# Patient Record
Sex: MALE | NOT HISPANIC OR LATINO | Employment: UNEMPLOYED | URBAN - METROPOLITAN AREA
[De-identification: names, ages, dates, MRNs, and addresses within clinical notes are randomized per-mention and may not be internally consistent; named-entity substitution may affect disease eponyms.]

---

## 2018-06-19 ENCOUNTER — HOSPITAL ENCOUNTER (EMERGENCY)
Facility: MEDICAL CENTER | Age: 63
End: 2018-06-19
Attending: EMERGENCY MEDICINE
Payer: MEDICAID

## 2018-06-19 VITALS
TEMPERATURE: 98 F | RESPIRATION RATE: 16 BRPM | SYSTOLIC BLOOD PRESSURE: 110 MMHG | DIASTOLIC BLOOD PRESSURE: 61 MMHG | BODY MASS INDEX: 19.5 KG/M2 | OXYGEN SATURATION: 98 % | WEIGHT: 143.96 LBS | HEIGHT: 72 IN | HEART RATE: 72 BPM

## 2018-06-19 DIAGNOSIS — S61.411A LACERATION OF RIGHT HAND WITHOUT FOREIGN BODY, INITIAL ENCOUNTER: ICD-10-CM

## 2018-06-19 PROCEDURE — 700111 HCHG RX REV CODE 636 W/ 250 OVERRIDE (IP): Performed by: EMERGENCY MEDICINE

## 2018-06-19 PROCEDURE — 90471 IMMUNIZATION ADMIN: CPT

## 2018-06-19 PROCEDURE — 90715 TDAP VACCINE 7 YRS/> IM: CPT | Performed by: EMERGENCY MEDICINE

## 2018-06-19 PROCEDURE — 304217 HCHG IRRIGATION SYSTEM

## 2018-06-19 PROCEDURE — 303747 HCHG EXTRA SUTURE

## 2018-06-19 PROCEDURE — 304999 HCHG REPAIR-SIMPLE/INTERMED LEVEL 1

## 2018-06-19 PROCEDURE — 99283 EMERGENCY DEPT VISIT LOW MDM: CPT

## 2018-06-19 RX ORDER — CEPHALEXIN 500 MG/1
500 CAPSULE ORAL 4 TIMES DAILY
Qty: 28 CAP | Refills: 0 | Status: SHIPPED | OUTPATIENT
Start: 2018-06-19 | End: 2023-09-21

## 2018-06-19 RX ADMIN — CLOSTRIDIUM TETANI TOXOID ANTIGEN (FORMALDEHYDE INACTIVATED), CORYNEBACTERIUM DIPHTHERIAE TOXOID ANTIGEN (FORMALDEHYDE INACTIVATED), BORDETELLA PERTUSSIS TOXOID ANTIGEN (GLUTARALDEHYDE INACTIVATED), BORDETELLA PERTUSSIS FILAMENTOUS HEMAGGLUTININ ANTIGEN (FORMALDEHYDE INACTIVATED), BORDETELLA PERTUSSIS PERTACTIN ANTIGEN, AND BORDETELLA PERTUSSIS FIMBRIAE 2/3 ANTIGEN 0.5 ML: 5; 2; 2.5; 5; 3; 5 INJECTION, SUSPENSION INTRAMUSCULAR at 08:16

## 2018-06-19 ASSESSMENT — PAIN SCALES - GENERAL: PAINLEVEL_OUTOF10: 0

## 2018-06-19 ASSESSMENT — LIFESTYLE VARIABLES: DO YOU DRINK ALCOHOL: NO

## 2018-06-19 NOTE — ED NOTES
Discharge instructions reviewed, no questions at this time.    Discussed home medications, prescription reviewed and given to patient.    Belongings returned to patient.    Patient ambulatory off unit.

## 2018-06-19 NOTE — ED PROVIDER NOTES
ED Provider Note    CHIEF COMPLAINT  Chief Complaint   Patient presents with   • Hand Injury       HPI  Vitor Cisneros is a 62 y.o. male who presents for evaluation of a hand laceration. The patient states he tripped this morning and fell injuring his right palm. He denies any loss of consciousness. He has no other injuries. He does not know when his last tetanus shot was.    REVIEW OF SYSTEMS  See HPI for further details. All other systems are negative.     PAST MEDICAL HISTORY  Past Medical History:   Diagnosis Date   • Heart murmur    • Schizophrenic disorder (HCC)        FAMILY HISTORY  History reviewed. No pertinent family history.    SOCIAL HISTORY  Social History     Social History   • Marital status: Single     Spouse name: N/A   • Number of children: N/A   • Years of education: N/A     Social History Main Topics   • Smoking status: Current Every Day Smoker     Packs/day: 1.50     Types: Cigarettes   • Smokeless tobacco: Never Used   • Alcohol use Yes      Comment: 3 shots of vodka every other day   • Drug use: Yes      Comment: THC daily   • Sexual activity: Not on file     Other Topics Concern   • Not on file     Social History Narrative   • No narrative on file       SURGICAL HISTORY  History reviewed. No pertinent surgical history.    CURRENT MEDICATIONS  Home Medications     Reviewed by Anila Tong R.N. (Registered Nurse) on 06/19/18 at 0719  Med List Status: Complete   Medication Last Dose Status   hydrocodone-acetaminophen (NORCO) 5-325 MG Tab per tablet  Active                ALLERGIES  No Known Allergies    PHYSICAL EXAM  VITAL SIGNS: /60   Pulse 70   Temp 36.6 °C (97.8 °F)   Resp 17   Ht 1.829 m (6')   Wt 65.3 kg (143 lb 15.4 oz)   SpO2 96%   BMI 19.52 kg/m²     Constitutional: Well developed, Well nourished, No acute distress, Non-toxic appearance.   HENT: Normocephalic, Atraumatic.   Eyes:  EOMI, Conjunctiva normal, No discharge.   Cardiovascular: Normal heart rate.   Thorax &  Lungs: No respiratory distress.   Skin: Warm, Dry.   Musculoskeletal: Right hand shows a flap-type laceration to the palm. This is approximately 4 cm in diameter. The wound does not appear to be grossly contaminated. The hand is neurovascularly intact.  Neurologic: Awake alert.    Laceration Repair Procedure Note    Indication: Laceration    Procedure: The patient was placed in the appropriate position and anesthesia around the laceration was obtained by infiltration using 1% Lidocaine without epinephrine. The area was then irrigated with high pressure normal saline. The laceration was closed with 5-0 Ethilon using interrupted sutures. There were no additional lacerations requiring repair. The wound area was then dressed with a sterile dressing.      Total repaired wound length: 6 cm.     Other Items: None    The patient tolerated the procedure well.    Complications: None          COURSE & MEDICAL DECISION MAKING  Pertinent Labs & Imaging studies reviewed. (See chart for details)  This is a 62-year-old here for evaluation of a hand laceration. The patient has a flap type laceration. His repaired per the procedure note above. I discussed with the patient that if possible. The overlying skin may not survive however we will use it as a biologic dressing. I will start him on Keflex. I referred him to the [facility] for follow-up. He is updated on his tetanus. He should return here for any worsening symptoms. He is given a discharge instruction sheet on laceration care. Stitches are to be out in 7-10 days.    FINAL IMPRESSION  1. Mechanical ground-level fall  2. Right hand laceration  3.         Electronically signed by: Sonu Arroyo, 6/19/2018 8:40 AM

## 2018-06-19 NOTE — DISCHARGE INSTRUCTIONS
Laceration Care, Adult  A laceration is a cut that goes through all of the layers of the skin and into the tissue that is right under the skin. Some lacerations heal on their own. Others need to be closed with stitches (sutures), staples, skin adhesive strips, or skin glue. Proper laceration care minimizes the risk of infection and helps the laceration to heal better.  HOW TO CARE FOR YOUR LACERATION  If sutures or staples were used:  · Keep the wound clean and dry.  · If you were given a bandage (dressing), you should change it at least one time per day or as told by your health care provider. You should also change it if it becomes wet or dirty.  · Keep the wound completely dry for the first 24 hours or as told by your health care provider. After that time, you may shower or bathe. However, make sure that the wound is not soaked in water until after the sutures or staples have been removed.  · Clean the wound one time each day or as told by your health care provider:  ¨ Wash the wound with soap and water.  ¨ Rinse the wound with water to remove all soap.  ¨ Pat the wound dry with a clean towel. Do not rub the wound.  · After cleaning the wound, apply a thin layer of antibiotic ointment as told by your health care provider. This will help to prevent infection and keep the dressing from sticking to the wound.  · Have the sutures or staples removed as told by your health care provider.  If skin adhesive strips were used:  · Keep the wound clean and dry.  · If you were given a bandage (dressing), you should change it at least one time per day or as told by your health care provider. You should also change it if it becomes dirty or wet.  · Do not get the skin adhesive strips wet. You may shower or bathe, but be careful to keep the wound dry.  · If the wound gets wet, pat it dry with a clean towel. Do not rub the wound.  · Skin adhesive strips fall off on their own. You may trim the strips as the wound heals. Do not  remove skin adhesive strips that are still stuck to the wound. They will fall off in time.  If skin glue was used:  · Try to keep the wound dry, but you may briefly wet it in the shower or bath. Do not soak the wound in water, such as by swimming.  · After you have showered or bathed, gently pat the wound dry with a clean towel. Do not rub the wound.  · Do not do any activities that will make you sweat heavily until the skin glue has fallen off on its own.  · Do not apply liquid, cream, or ointment medicine to the wound while the skin glue is in place. Using those may loosen the film before the wound has healed.  · If you were given a bandage (dressing), you should change it at least one time per day or as told by your health care provider. You should also change it if it becomes dirty or wet.  · If a dressing is placed over the wound, be careful not to apply tape directly over the skin glue. Doing that may cause the glue to be pulled off before the wound has healed.  · Do not pick at the glue. The skin glue usually remains in place for 5-10 days, then it falls off of the skin.  General Instructions  · Take over-the-counter and prescription medicines only as told by your health care provider.  · If you were prescribed an antibiotic medicine or ointment, take or apply it as told by your doctor. Do not stop using it even if your condition improves.  · To help prevent scarring, make sure to cover your wound with sunscreen whenever you are outside after stitches are removed, after adhesive strips are removed, or when glue remains in place and the wound is healed. Make sure to wear a sunscreen of at least 30 SPF.  · Do not scratch or pick at the wound.  · Keep all follow-up visits as told by your health care provider. This is important.  · Check your wound every day for signs of infection. Watch for:  ¨ Redness, swelling, or pain.  ¨ Fluid, blood, or pus.  · Raise (elevate) the injured area above the level of your heart  while you are sitting or lying down, if possible.  SEEK MEDICAL CARE IF:  · You received a tetanus shot and you have swelling, severe pain, redness, or bleeding at the injection site.  · You have a fever.  · A wound that was closed breaks open.  · You notice a bad smell coming from your wound or your dressing.  · You notice something coming out of the wound, such as wood or glass.  · Your pain is not controlled with medicine.  · You have increased redness, swelling, or pain at the site of your wound.  · You have fluid, blood, or pus coming from your wound.  · You notice a change in the color of your skin near your wound.  · You need to change the dressing frequently due to fluid, blood, or pus draining from the wound.  · You develop a new rash.  · You develop numbness around the wound.  SEEK IMMEDIATE MEDICAL CARE IF:  · You develop severe swelling around the wound.  · Your pain suddenly increases and is severe.  · You develop painful lumps near the wound or on skin that is anywhere on your body.  · You have a red streak going away from your wound.  · The wound is on your hand or foot and you cannot properly move a finger or toe.  · The wound is on your hand or foot and you notice that your fingers or toes look pale or bluish.     This information is not intended to replace advice given to you by your health care provider. Make sure you discuss any questions you have with your health care provider.     Document Released: 12/18/2006 Document Revised: 05/03/2016 Document Reviewed: 12/14/2015  [email or web address] Interactive Patient Education ©2016 [email or web address]

## 2018-06-19 NOTE — ED NOTES
Patient has about 4 cm horseshoe-shaped avulsion to R palm, cleansed with water and gauze placed over for comfort. Pain 5/10. No other complaints at this time.

## 2018-08-22 ENCOUNTER — APPOINTMENT (OUTPATIENT)
Dept: RADIOLOGY | Facility: MEDICAL CENTER | Age: 63
End: 2018-08-22
Attending: EMERGENCY MEDICINE
Payer: MEDICAID

## 2018-08-22 ENCOUNTER — HOSPITAL ENCOUNTER (EMERGENCY)
Facility: MEDICAL CENTER | Age: 63
End: 2018-08-22
Attending: EMERGENCY MEDICINE
Payer: MEDICAID

## 2018-08-22 VITALS
WEIGHT: 133.82 LBS | HEART RATE: 82 BPM | HEIGHT: 72 IN | BODY MASS INDEX: 18.13 KG/M2 | TEMPERATURE: 98.6 F | SYSTOLIC BLOOD PRESSURE: 139 MMHG | OXYGEN SATURATION: 95 % | DIASTOLIC BLOOD PRESSURE: 79 MMHG | RESPIRATION RATE: 16 BRPM

## 2018-08-22 DIAGNOSIS — R63.4 WEIGHT LOSS: ICD-10-CM

## 2018-08-22 DIAGNOSIS — F20.89 OTHER SCHIZOPHRENIA (HCC): ICD-10-CM

## 2018-08-22 LAB
ALBUMIN SERPL BCP-MCNC: 3.9 G/DL (ref 3.2–4.9)
ALBUMIN/GLOB SERPL: 1 G/DL
ALP SERPL-CCNC: 85 U/L (ref 30–99)
ALT SERPL-CCNC: 20 U/L (ref 2–50)
ANION GAP SERPL CALC-SCNC: 11 MMOL/L (ref 0–11.9)
AST SERPL-CCNC: 50 U/L (ref 12–45)
BASOPHILS # BLD AUTO: 0.5 % (ref 0–1.8)
BASOPHILS # BLD: 0.04 K/UL (ref 0–0.12)
BILIRUB SERPL-MCNC: 0.5 MG/DL (ref 0.1–1.5)
BUN SERPL-MCNC: 12 MG/DL (ref 8–22)
CALCIUM SERPL-MCNC: 9.4 MG/DL (ref 8.5–10.5)
CHLORIDE SERPL-SCNC: 106 MMOL/L (ref 96–112)
CO2 SERPL-SCNC: 25 MMOL/L (ref 20–33)
CREAT SERPL-MCNC: 0.55 MG/DL (ref 0.5–1.4)
EOSINOPHIL # BLD AUTO: 0.09 K/UL (ref 0–0.51)
EOSINOPHIL NFR BLD: 1.1 % (ref 0–6.9)
ERYTHROCYTE [DISTWIDTH] IN BLOOD BY AUTOMATED COUNT: 45.2 FL (ref 35.9–50)
GLOBULIN SER CALC-MCNC: 4.1 G/DL (ref 1.9–3.5)
GLUCOSE SERPL-MCNC: 74 MG/DL (ref 65–99)
HCT VFR BLD AUTO: 40.8 % (ref 42–52)
HGB BLD-MCNC: 13.5 G/DL (ref 14–18)
IMM GRANULOCYTES # BLD AUTO: 0.03 K/UL (ref 0–0.11)
IMM GRANULOCYTES NFR BLD AUTO: 0.4 % (ref 0–0.9)
LIPASE SERPL-CCNC: 17 U/L (ref 11–82)
LYMPHOCYTES # BLD AUTO: 1.11 K/UL (ref 1–4.8)
LYMPHOCYTES NFR BLD: 14.2 % (ref 22–41)
MCH RBC QN AUTO: 33.6 PG (ref 27–33)
MCHC RBC AUTO-ENTMCNC: 33.1 G/DL (ref 33.7–35.3)
MCV RBC AUTO: 101.5 FL (ref 81.4–97.8)
MONOCYTES # BLD AUTO: 0.48 K/UL (ref 0–0.85)
MONOCYTES NFR BLD AUTO: 6.1 % (ref 0–13.4)
NEUTROPHILS # BLD AUTO: 6.08 K/UL (ref 1.82–7.42)
NEUTROPHILS NFR BLD: 77.7 % (ref 44–72)
NRBC # BLD AUTO: 0 K/UL
NRBC BLD-RTO: 0 /100 WBC
PLATELET # BLD AUTO: 236 K/UL (ref 164–446)
PMV BLD AUTO: 9.7 FL (ref 9–12.9)
POC BREATHALIZER: 0 PERCENT (ref 0–0.01)
POTASSIUM SERPL-SCNC: 4 MMOL/L (ref 3.6–5.5)
PROT SERPL-MCNC: 8 G/DL (ref 6–8.2)
RBC # BLD AUTO: 4.02 M/UL (ref 4.7–6.1)
SODIUM SERPL-SCNC: 142 MMOL/L (ref 135–145)
TROPONIN I SERPL-MCNC: <0.01 NG/ML (ref 0–0.04)
TSH SERPL DL<=0.005 MIU/L-ACNC: 0.54 UIU/ML (ref 0.38–5.33)
WBC # BLD AUTO: 7.8 K/UL (ref 4.8–10.8)

## 2018-08-22 PROCEDURE — 302970 POC BREATHALIZER: Performed by: EMERGENCY MEDICINE

## 2018-08-22 PROCEDURE — 84443 ASSAY THYROID STIM HORMONE: CPT

## 2018-08-22 PROCEDURE — 90791 PSYCH DIAGNOSTIC EVALUATION: CPT

## 2018-08-22 PROCEDURE — 84484 ASSAY OF TROPONIN QUANT: CPT

## 2018-08-22 PROCEDURE — 71045 X-RAY EXAM CHEST 1 VIEW: CPT

## 2018-08-22 PROCEDURE — 36415 COLL VENOUS BLD VENIPUNCTURE: CPT

## 2018-08-22 PROCEDURE — 80053 COMPREHEN METABOLIC PANEL: CPT

## 2018-08-22 PROCEDURE — 85025 COMPLETE CBC W/AUTO DIFF WBC: CPT

## 2018-08-22 PROCEDURE — 99284 EMERGENCY DEPT VISIT MOD MDM: CPT

## 2018-08-22 PROCEDURE — 83690 ASSAY OF LIPASE: CPT

## 2018-08-22 ASSESSMENT — PAIN SCALES - GENERAL
PAINLEVEL_OUTOF10: 0
PAINLEVEL_OUTOF10: 8

## 2018-08-22 NOTE — ED TRIAGE NOTES
"Vitor Cisneros  Chief Complaint   Patient presents with   • Loss of Appetite   • Other   • Psych Eval     Pt ambulatory to triage with above complaint. Pt c/o weight loss and having a decreased appetite. Pt also c/o of generalized weakness, stating \"I feel sick.\" Pt denies SOB, CP, or abd pain. Pt also c/o of feeling depressed, pt denies SI/HI.  Pt reports having prescription for trazodone, but admits to being non-compliant with medications.    /83   Pulse 74   Temp 37 °C (98.6 °F) (Temporal)   Resp 14   Ht 1.829 m (6')   Wt 60.7 kg (133 lb 13.1 oz)   SpO2 95%   BMI 18.15 kg/m²     Pt informed of triage process and encouraged to notify staff of any changes or concerns. Pt verbalized understanding of instructions. Apologized for long wait time. Pt placed back in lobby.     "

## 2018-08-22 NOTE — DISCHARGE INSTRUCTIONS
Follow-up with your doctor.  Return to the ER for any new medical problems or complaints follow-up with [facility] for primary care      Schizophrenia  Schizophrenia is a mental illness. It may cause disturbed or disorganized thinking, speech, or behavior. People with schizophrenia have problems functioning in one or more areas of life. People with schizophrenia are at increased risk for suicide, certain long-term (chronic) physical illnesses, and unhealthy behaviors, such as smoking and drug use.  People who have family members with schizophrenia are at higher risk of developing the illness. Schizophrenia affects men and women equally, but it usually appears at an earlier age (teenage or early adult years) in men.  What are the causes?  The cause of this condition is not known.  What increases the risk?  The following factors may make you more likely to develop this condition:  · Having a family member who has schizophrenia. Some gene combinations may increase the risk, but there is no single gene that causes schizophrenia.  · Impaired brain or neurotransmitter development or chemistry. Neurotransmitters are chemicals in the brain.  What are the signs or symptoms?  The earliest symptoms are often subtle and may go unnoticed until the illness becomes more severe (first-break psychosis). Symptoms of schizophrenia may be ongoing (continuous) or may come and go in severity. Episodes are often triggered by major life events, such as:  · Family stress.  · College.  ·  service.  · Marriage.  · Pregnancy or childbirth.  · Divorce.  · Loss of a loved one.  Symptoms may include:  · Seeing, hearing, or feeling things that do not exist (hallucinations).  · Having false beliefs (delusions). Delusions often involve beliefs that you are being attacked, harassed, cheated, persecuted, or conspired against (persecutory delusions).  · Speech that does not make sense to others or is hard to understand  (incoherent).  · Behavior that is odd, confused, unfocused, withdrawn, or disorganized.  · Extremely overactive or underactive motor activity (catatonia). Motor activity is any action that involves the muscles.  · [place] or blunted emotions (flat affect).  · Loss of will power (avolition).  · Withdrawal from social contacts (social isolation).  Symptoms may affect the level of functioning in one or more major areas of life, such as work, school, relationships, or self-care.  How is this diagnosed?  Schizophrenia is diagnosed through an assessment by a mental health care provider.  · Your mental health care provider may ask questions about:  ¨ Your thoughts, behavior, and mood.  ¨ Your ability to function in daily life.  ¨ Your medical history.  ¨ Any use of alcohol or drugs, including prescription medicines.  · You may have blood tests and imaging exams.  How is this treated?  Schizophrenia is a chronic illness that is best controlled with continuous treatment rather than treatment only when symptoms occur. The following treatments are used to manage schizophrenia:  · Medicine. This is the most effective and important form of treatment for schizophrenia. Antipsychotic medicines are usually prescribed to help manage schizophrenia. Other types of medicine may be added to relieve any symptoms that may occur despite the use of antipsychotic medicines.  · Counseling or talk therapy. Individual, group, or family counseling may be helpful in providing education, support, and guidance. Many people also benefit from social skills and job skills (vocational) training.  A combination of medicine and counseling is best for managing the disorder over time. A procedure in which electricity is applied to the brain through the scalp (electroconvulsive therapy) may be used to treat catatonic schizophrenia or schizophrenia in people who cannot take medicine or do not respond to medicine and counseling.  Follow these instructions at  home:  · Keep stress under control. Stress may trigger psychosis and make symptoms worse.  · Try to get as much sleep as you can.  · Avoid alcohol and drugs. They can affect how medicine works and make symptoms worse.  · Surround yourself with people who care about you and can help you manage your condition.  · Take over-the-counter and prescription medicines only as told by your health care provider.  · Keep all follow-up visits as told by your health care provider and counselor. This is important.  Contact a health care provider if:  · You have a bad response to changes in medicines or to your treatment plan.  · You have trouble falling sleep.  · You have a low mood that will not go away.  · You are using:  ¨ Drugs.  ¨ Too much caffeine.  ¨ Tobacco products.  ¨ Alcohol.  Get help right away if:  · You feel out of control.  · You or others notice warning signs of suicide such as:  ¨ Increased use of drugs or alcohol  ¨ Expressing feelings of not having a purpose in life, being trapped, guilty, anxious and agitated, or hopeless.  ¨ Withdrawing from friends and family.  ¨ Showing uncontrolled anger, recklessness, and dramatic mood changes.  ¨ Talking about suicide, discussing or searching for methods.  If you ever feel like you may hurt yourself or others, or have thoughts about taking your own life, get help right away. You can go to your nearest emergency department or call:  · Your local emergency services (911 in the [place]  · A suicide crisis helpline, such as the National Suicide Prevention Lifeline at 1-931.244.7839. This is open 24 hours a day.  Summary  · Schizophrenia is a mental illness that causes disturbed or disorganized thinking, speech, or behavior.  · Symptoms of schizophrenia may be ongoing or may come and go. They are often triggered by major life events.  · Keep stress under control. Stress may trigger psychosis and make symptoms worse.  · Avoid alcohol and drugs. They can affect how medicine  works and make symptoms worse.  · Get help right away if you feel out of control.  This information is not intended to replace advice given to you by your health care provider. Make sure you discuss any questions you have with your health care provider.  Document Released: 12/15/2001 Document Revised: 09/29/2017 Document Reviewed: 09/29/2017  [employer] Interactive Patient Education © 2017 [employer]

## 2018-08-22 NOTE — ED PROVIDER NOTES
ED Provider Note    CHIEF COMPLAINT  Chief Complaint   Patient presents with   • Loss of Appetite   • Other   • Psych Eval       HPI  Vitor Cisneros is a 62 y.o. male who presents to the emergency department for loss of appetite, and an exacerbation of schizophrenia.  The patient states that he has lost his medications is not taking his medications.  Not sure with hearing voices.  Denies being suicidal.  Concerned he is losing weight.  Has had about a 30 pound unintentional weight loss over the last 6 months.  Denies any fevers or chills.  Denies any chest pain cough shortness of breath.  No nausea vomiting or diaphoresis.  Nothing makes it better, nothing makes it worse.  No other acute concerns or complaints.    REVIEW OF SYSTEMS  See HPI for further details. All other systems are negative.    PAST MEDICAL HISTORY  Past Medical History:   Diagnosis Date   • Heart murmur    • Schizophrenic disorder (HCC)        FAMILY HISTORY  History reviewed. No pertinent family history.    SOCIAL HISTORY  Social History     Social History   • Marital status: Single     Spouse name: N/A   • Number of children: N/A   • Years of education: N/A     Social History Main Topics   • Smoking status: Current Every Day Smoker     Packs/day: 1.50     Types: Cigarettes   • Smokeless tobacco: Never Used   • Alcohol use Yes      Comment: 3 shots of vodka every other day and weekends   • Drug use: Yes     Types: Inhaled      Comment: THC daily   • Sexual activity: Not on file     Other Topics Concern   • Not on file     Social History Narrative   • No narrative on file       SURGICAL HISTORY  History reviewed. No pertinent surgical history.    CURRENT MEDICATIONS  Home Medications     Reviewed by Dajuan Alatorre R.N. (Registered Nurse) on 08/22/18 at 0748  Med List Status: Partial   Medication Last Dose Status   cephALEXin (KEFLEX) 500 MG Cap Not Taking Active   hydrocodone-acetaminophen (NORCO) 5-325 MG Tab per tablet Not Taking Active    TRAZODONE HCL PO  Active                ALLERGIES  No Known Allergies    PHYSICAL EXAM  VITAL SIGNS: /83   Pulse 74   Temp 37 °C (98.6 °F) (Temporal)   Resp 14   Ht 1.829 m (6')   Wt 60.7 kg (133 lb 13.1 oz)   SpO2 95%   BMI 18.15 kg/m²    Constitutional: Dirty and unkempt.  No acute distress.  HENT: Normocephalic, Atraumatic, Bilateral external ears normal, Oropharynx moist, No oral exudates, Nose normal.   Eyes: PERRL, EOMI, Conjunctiva normal, No discharge.   Neck: Normal range of motion, No tenderness, Supple, No stridor.   Lymphatic: No lymphadenopathy noted.   Cardiovascular: Normal heart rate, Normal rhythm, No murmurs, No rubs, No gallops.   Thorax & Lungs: Normal breath sounds, No respiratory distress, No wheezing, No chest tenderness.   Abdomen: Bowel sounds normal, Soft, No tenderness,  Skin: Warm, Dry, No erythema, No rash.   Back: No tenderness, No CVA tenderness. .   Musculoskeletal: Good range of motion in all major joints.  Neurologic: Alert & oriented x 3, No focal deficits noted.   Psychiatric: Affect flat.  Positive auditory hallucinations    RADIOLOGY/PROCEDURES  DX-CHEST-PORTABLE (1 VIEW)   Final Result         1. No acute cardiopulmonary abnormalities are identified.            Results for orders placed or performed during the hospital encounter of 08/22/18   CBC WITH DIFFERENTIAL   Result Value Ref Range    WBC 7.8 4.8 - 10.8 K/uL    RBC 4.02 (L) 4.70 - 6.10 M/uL    Hemoglobin 13.5 (L) 14.0 - 18.0 g/dL    Hematocrit 40.8 (L) 42.0 - 52.0 %    .5 (H) 81.4 - 97.8 fL    MCH 33.6 (H) 27.0 - 33.0 pg    MCHC 33.1 (L) 33.7 - 35.3 g/dL    RDW 45.2 35.9 - 50.0 fL    Platelet Count 236 164 - 446 K/uL    MPV 9.7 9.0 - 12.9 fL    Neutrophils-Polys 77.70 (H) 44.00 - 72.00 %    Lymphocytes 14.20 (L) 22.00 - 41.00 %    Monocytes 6.10 0.00 - 13.40 %    Eosinophils 1.10 0.00 - 6.90 %    Basophils 0.50 0.00 - 1.80 %    Immature Granulocytes 0.40 0.00 - 0.90 %    Nucleated RBC 0.00 /100 WBC     Neutrophils (Absolute) 6.08 1.82 - 7.42 K/uL    Lymphs (Absolute) 1.11 1.00 - 4.80 K/uL    Monos (Absolute) 0.48 0.00 - 0.85 K/uL    Eos (Absolute) 0.09 0.00 - 0.51 K/uL    Baso (Absolute) 0.04 0.00 - 0.12 K/uL    Immature Granulocytes (abs) 0.03 0.00 - 0.11 K/uL    NRBC (Absolute) 0.00 K/uL   COMP METABOLIC PANEL   Result Value Ref Range    Sodium 142 135 - 145 mmol/L    Potassium 4.0 3.6 - 5.5 mmol/L    Chloride 106 96 - 112 mmol/L    Co2 25 20 - 33 mmol/L    Anion Gap 11.0 0.0 - 11.9    Glucose 74 65 - 99 mg/dL    Bun 12 8 - 22 mg/dL    Creatinine 0.55 0.50 - 1.40 mg/dL    Calcium 9.4 8.5 - 10.5 mg/dL    AST(SGOT) 50 (H) 12 - 45 U/L    ALT(SGPT) 20 2 - 50 U/L    Alkaline Phosphatase 85 30 - 99 U/L    Total Bilirubin 0.5 0.1 - 1.5 mg/dL    Albumin 3.9 3.2 - 4.9 g/dL    Total Protein 8.0 6.0 - 8.2 g/dL    Globulin 4.1 (H) 1.9 - 3.5 g/dL    A-G Ratio 1.0 g/dL   LIPASE   Result Value Ref Range    Lipase 17 11 - 82 U/L   TROPONIN   Result Value Ref Range    Troponin I <0.01 0.00 - 0.04 ng/mL   TSH   Result Value Ref Range    TSH 0.540 0.380 - 5.330 uIU/mL   ESTIMATED GFR   Result Value Ref Range    GFR If African American >60 >60 mL/min/1.73 m 2    GFR If Non African American >60 >60 mL/min/1.73 m 2   POC BREATHALIZER   Result Value Ref Range    POC Breathalizer 0.00 0.00 - 0.01 Percent        COURSE & MEDICAL DECISION MAKING  Pertinent Labs & Imaging studies reviewed. (See chart for details)  The patient presents emergency department for weight loss, and decompensated schizophrenia.  He denies being suicidal to me.  States he is hearing voices.  Would like to get restarted on his medications.  From this perspective he seems fairly stable he is not suicidal homicidal.  Is been seen by Rita does not meet criteria to be held against as well.  He can follow-up as an outpatient.  Rita is arranged outpatient follow-up for him with psychiatry.    The patient reports a weight loss.  Look to his chart he does  have about a 20-25 pound weight loss.  The metabolic workup including a screening chest x-ray which does not show malignancy.  He has a mild anemia.  This could represent a likely needs further workup.  Troponin is negative.  TSH is normal.      I had the  come down and arrange primary care follow-up appointment for him.  States they will comply.  The patient's questions are answered, is agreeable to plan and discharged in good condition.    Rashaun Lopez M.D.  [address] [place] [id number]  [phone]    Go on 9/6/2018  follow up appointment scheduled at 12:50 pm          The patient was noted to have elevated blood pressure while in the ER and was counseled to see their doctor within one wee to have this rechecked.    FINAL IMPRESSION  1. Other schizophrenia (HCC)    2. Weight loss        2.   3.         Electronically signed by: Michael Rodriguez, 8/22/2018 8:42 AM

## 2018-08-22 NOTE — ED NOTES
Pt discharged to home. Pt was given follow up instructions. Pt verbalized understanding of all instructions for discharge and is ambulatory out of ED with steady gait.

## 2018-08-22 NOTE — CONSULTS
"[facility]   TRIAGE ASSESSMENT    Name: Vitor Cisneros  MRN: [id number]  : 1955  Age: 62 y.o.  Date of assessment: 2018  PCP: Pcp Pt States None  Persons in attendance: Patient    CHIEF COMPLAINT/PRESENTING ISSUE (as stated by pt):   Chief Complaint   Patient presents with   • Loss of Appetite   • Other   • Psych Eval        CURRENT LIVING SITUATION/SOCIAL SUPPORT: pt reports depression; off depression meds for a month.  Reports he was living in a motel, but \"lost my room,\" and has since been homeless.  He stopped taking his meds because he felt he had no where to keep them while on the streets.    Pt denies SI, HI and hallucinations; alert and oriented x4.  He is able to care for self, reporting in detail how and where he gets food and water.    BEHAVIORAL HEALTH TREATMENT HISTORY  Does patient/parent report a history of prior behavioral health treatment for patient?   Yes:    Dates Level of Care Facilty/Provider Diagnosis/Problem Medications   2017 inpt [facility] depression    current Outpt [facility]-psych med mgmt                                                             Pt denies any other inpt psych hospitalization.  Hx of schizophrenia on chart but pt denies.      SAFETY ASSESSMENT - SELF  Does patient acknowledge current or past symptoms of dangerousness to self? no  Does parent/significant other report patient has current or past symptoms of dangerousness to self? N\A  Does presenting problem suggest symptoms of dangerousness to self? No    SAFETY ASSESSMENT - OTHERS  Does patient acknowledge current or past symptoms of aggressive behavior or risk to others? no  Does parent/significant other report patient has current or past symptoms of aggressive behavior or risk to others?  N\A  Does presenting problem suggest symptoms of dangerousness to others? No    Crisis Safety Plan completed and copy given to patient? No, eval only    ABUSE/NEGLECT SCREENING  Does patient report feeling " "“unsafe” in his/her home, or afraid of anyone?  no  Does patient report any history of physical, sexual, or emotional abuse?  no  Does parent or significant other report any of the above? N\A  Is there evidence of neglect by self?  no  Is there evidence of neglect by a caregiver? no  Does the patient/parent report any history of CPS/APS/police involvement related to suspected abuse/neglect or domestic violence? no  Based on the information provided during the current assessment, is a mandated report of suspected abuse/neglect being made?  No    SUBSTANCE USE SCREENING  Yes:  Mele all substances used in the past 30 days:  Pt denies all substance use      Last Use Amount   []   Alcohol     []   Marijuana     []   Heroin     []   Prescription Opioids  (used without prescription, for    recreation, or in excess of prescribed amount)     []   Other Prescription  (used without prescription, for    recreation, or in excess of prescribed amount)     []   Cocaine      []   Methamphetamine     []   \"\" drugs (ectasy, MDMA)     []   Other substances        UDS results: none  Breathalyzer results: 0.0    What consequences does the patient associate with any of the above substance use and or addictive behaviors? None    Risk factors for detox (check all that apply):  []  Seizures   []  Diaphoretic (sweating)   []  Tremors   []  Hallucinations   []  Increased blood pressure   []  Decreased blood pressure   []  Other   []  None      [] Patient education on risk factors for detoxification and instructed to return to ER as needed.      MENTAL STATUS   Participation: Active verbal participation, Engaged and Open to feedback  Grooming: Casual  Orientation: Alert and Fully Oriented  Behavior: Calm  Eye contact: Good  Mood: Depressed  Affect: Flexible  Thought process: Logical and Goal-directed  Thought content: Within normal limits  Speech: Rate within normal limits and Volume within normal limits  Perception: Within normal " limits  Memory:  No gross evidence of memory deficits  Insight: Adequate  Judgment:  Adequate  Other:    Collateral information: past visits  Source:  [] Significant other present in person:   [] Significant other by telephone  [] [facility]   [] [facility] Nursing Staff  [x] [facility]  [] Other:     [] Unable to complete full assessment due to:  [] Acute intoxication  [] Patient declined to participate/engage  [] Patient verbally unresponsive  [] Significant cognitive deficits  [] Significant perceptual distortions or behavioral disorganization  [] Other:      CLINICAL IMPRESSIONS:  Primary:  Depression  Secondary:  Medication Noncompliance       IDENTIFIED NEEDS/PLAN:  [Trigger DISPOSITION list for any items marked]    []  Imminent safety risk - self [] Imminent safety risk - others   []  Acute substance withdrawal []  Psychosis/Impaired reality testing   [x]  Mood/anxiety []  Substance use/Addictive behavior   []  Maladaptive behaviro []  Parent/child conflict   []  Family/Couples conflict []  Biomedical   [x]  Housing [x]  Financial   []   Legal  Occupational/Educational   []  Domestic violence []  Other:     Disposition: Refer to [facility] and [facility]    Does patient express agreement with the above plan? yes    Referral appointment(s) scheduled? N\A    Alert team only: Pt to DC to self.  Pt advised to go back to [facility] and get back on his meds.  Talked about finding a way to keep them on his person to ensure he stays on them.  Talked about the side effects, including exacerbated symptoms, from abruptly ceasing psych meds.  Pt agreeble and says he plans to go back, or to the [facility], which I also offered as an option.    I have discussed findings and recommendations with Dr. Puga, who is in agreement with these recommendations.       Lisa Granados R.N.  8/22/2018

## 2018-08-22 NOTE — ED NOTES
Pt requesting food and water, all results other than urine are back. Pt aware that urine sample is needed.

## 2019-01-12 ENCOUNTER — HOSPITAL ENCOUNTER (EMERGENCY)
Facility: MEDICAL CENTER | Age: 64
End: 2019-01-12
Attending: EMERGENCY MEDICINE
Payer: MEDICAID

## 2019-01-12 VITALS
BODY MASS INDEX: 18.31 KG/M2 | RESPIRATION RATE: 16 BRPM | DIASTOLIC BLOOD PRESSURE: 81 MMHG | WEIGHT: 135 LBS | HEART RATE: 82 BPM | SYSTOLIC BLOOD PRESSURE: 127 MMHG | TEMPERATURE: 97.3 F

## 2019-01-12 DIAGNOSIS — F10.920 ALCOHOLIC INTOXICATION WITHOUT COMPLICATION (HCC): ICD-10-CM
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
## 2019-01-12 DIAGNOSIS — T14.8XXA ABRASION: ICD-10-CM

## 2019-01-12 LAB — POC BREATHALIZER: 0.18 PERCENT (ref 0–0.01)

## 2019-01-12 PROCEDURE — 99284 EMERGENCY DEPT VISIT MOD MDM: CPT

## 2019-01-12 PROCEDURE — 302970 POC BREATHALIZER: Performed by: EMERGENCY MEDICINE

## 2019-01-12 NOTE — ED PROVIDER NOTES
ED Provider Note    CHIEF COMPLAINT  Chief Complaint   Patient presents with   • Alcohol Intoxication     EMS reports pt was seen walking down sidewalk when he fell and hit his head. Skin tear to right outer head. ETOH       HPI  Vitor Cisneros is a 63 y.o. male who presents for evaluation after witnessed ground-level fall.  The patient is brought in by EMS.  He was walking down the sidewalk when the fire department saw him fall to the ground.  He had no loss of consciousness.  He appears to be intoxicated and therefore is brought to the emergency department.  Patient states he is not been drinking although a week below on the breathalyzer has come back at 0.25.  The patient has no medical complaints at this time.  He does have a past medical history of schizophrenia.  He has been seen in this emergency department multiple times.    REVIEW OF SYSTEMS  See HPI for further details. All other systems negative.    PAST MEDICAL HISTORY  Past Medical History:   Diagnosis Date   • Heart murmur    • Schizophrenic disorder (HCC)        FAMILY HISTORY  No family history on file.    SOCIAL HISTORY  Social History     Social History   • Marital status: Single     Spouse name: N/A   • Number of children: N/A   • Years of education: N/A     Social History Main Topics   • Smoking status: Current Every Day Smoker     Packs/day: 1.50     Types: Cigarettes   • Smokeless tobacco: Never Used   • Alcohol use Yes      Comment: 3 shots of vodka every other day and weekends   • Drug use: Yes     Types: Inhaled      Comment: THC daily   • Sexual activity: Not on file     Other Topics Concern   • Not on file     Social History Narrative   • No narrative on file       SURGICAL HISTORY  No past surgical history on file.    CURRENT MEDICATIONS  Home Medications    **Home medications have not yet been reviewed for this encounter**         ALLERGIES  No Known Allergies    PHYSICAL EXAM  VITAL SIGNS: /81   Pulse 82   Temp 36.3 °C (97.3 °F)  (Temporal)   Resp 16   Wt 61.2 kg (135 lb)   BMI 18.31 kg/m²   Constitutional: Well developed, Well nourished, No acute distress.   HENT: Normocephalic, very minor abrasion to the right parietal region.  No bleeding.  Eyes:  EOMI, Conjunctiva normal, No discharge.   Cardiovascular: Normal heart rate, Normal rhythm, No murmurs, No rubs, No gallops.   Thorax & Lungs: Clear to auscultation without wheezes, rales, or rhonchi.    Abdomen: Soft and nontender.   Skin: Warm, Dry.  Musculoskeletal: Good range of motion in all major joints.  Neurologic: Sleepy but arousable to verbal stimuli.  He moves all extremities spontaneously and symmetrically with no focal weakness.  He is slurring his speech and appears intoxicated.      COURSE & MEDICAL DECISION MAKING  Pertinent Labs & Imaging studies reviewed. (See chart for details)  This is a 63-year-old here for evaluation after a witnessed ground-level fall.  The patient is an alcoholic and is acutely intoxicated.  He did sustain minor trauma to his head.  I do not suspect an acute intracranial injury at this time and I have not ordered a CT scan for evaluation.  The patient will be kept here in the emergency department and observed until he is functionally sober at which time he will be discharged.  He will be provided a discharge instruction sheet on alcohol intoxication and abrasions.    FINAL IMPRESSION  1.  Acute alcohol intoxication  2.  Scalp abrasion  3.  Ground-level fall         Electronically signed by: Sonu Arroyo, 1/12/2019 1:27 PM

## 2019-01-13 NOTE — ED NOTES
"Pt able to ambulate stable/steady gait. When asking pt orientation questions pt stated \"the fuck you asking me questions for, get the fuck out my room.\" Pt then began yelling constantly as RN left room. Secuirty notified. Security escorted pt out of ED.  "

## 2019-01-29 ENCOUNTER — DOCUMENTATION (OUTPATIENT)
Dept: CARDIOLOGY | Facility: MEDICAL CENTER | Age: 64
End: 2019-01-29

## 2019-01-29 NOTE — PROGRESS NOTES
Received EKG done at [facility], but into scanning for upcoming NP appt with [facility] on 1-31-19.

## 2019-06-28 NOTE — NUR
FIRST CONTACT WITH PT. PT C/O ABDOMINAL PAIN WITH N/V X TODAY. PT'S AOX4. RESP 
SEVEN AND UNLABORED. BP/SPO2 MONITORS IN PLACE. CALL LIGHT WITHIN REACH. PA AT 
BEDSIDE TO EVALUATE AT THIS TIME.

## 2020-04-14 ENCOUNTER — HOSPITAL ENCOUNTER (EMERGENCY)
Dept: HOSPITAL 8 - ED | Age: 65
Discharge: HOME | End: 2020-04-14
Payer: MEDICAID

## 2020-04-14 VITALS — SYSTOLIC BLOOD PRESSURE: 136 MMHG | DIASTOLIC BLOOD PRESSURE: 90 MMHG

## 2020-04-14 VITALS — WEIGHT: 140.21 LBS | HEIGHT: 73 IN | BODY MASS INDEX: 18.58 KG/M2

## 2020-04-14 DIAGNOSIS — Z48.02: ICD-10-CM
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
## 2020-04-14 DIAGNOSIS — S01.81XD: Primary | ICD-10-CM

## 2020-04-14 PROCEDURE — 99281 EMR DPT VST MAYX REQ PHY/QHP: CPT

## 2020-04-14 NOTE — NUR
TASK RN: Patient given discharge instructions and they have confirmed that they 
understand the instructions.  Patient ambulatory with steady gait. Pt left with 
d/c paperwork and all personal belongings.

## 2020-04-14 NOTE — NUR
TASK RN, FIRST CONTACT WITH PT. PA at bedside. Pt sitting on gurney. No acute 
distress noticed. Pt states, "It feels like two stiches are left in my skin 
from when I had them removed in November." No needs expressed, call light 
within reach.

## 2021-03-03 DIAGNOSIS — Z23 NEED FOR VACCINATION: ICD-10-CM

## 2021-05-27 ENCOUNTER — APPOINTMENT (OUTPATIENT)
Dept: RADIOLOGY | Facility: MEDICAL CENTER | Age: 66
End: 2021-05-27
Attending: EMERGENCY MEDICINE
Payer: MEDICAID

## 2021-05-27 ENCOUNTER — HOSPITAL ENCOUNTER (EMERGENCY)
Facility: MEDICAL CENTER | Age: 66
End: 2021-05-27
Attending: EMERGENCY MEDICINE
Payer: MEDICAID

## 2021-05-27 VITALS
HEART RATE: 65 BPM | BODY MASS INDEX: 18.6 KG/M2 | OXYGEN SATURATION: 96 % | SYSTOLIC BLOOD PRESSURE: 153 MMHG | WEIGHT: 137.35 LBS | TEMPERATURE: 98.9 F | DIASTOLIC BLOOD PRESSURE: 77 MMHG | RESPIRATION RATE: 16 BRPM | HEIGHT: 72 IN

## 2021-05-27 DIAGNOSIS — S42.021A CLOSED DISPLACED FRACTURE OF SHAFT OF RIGHT CLAVICLE, INITIAL ENCOUNTER: ICD-10-CM

## 2021-05-27 PROCEDURE — 700102 HCHG RX REV CODE 250 W/ 637 OVERRIDE(OP): Performed by: EMERGENCY MEDICINE

## 2021-05-27 PROCEDURE — A9270 NON-COVERED ITEM OR SERVICE: HCPCS | Performed by: EMERGENCY MEDICINE

## 2021-05-27 PROCEDURE — 73000 X-RAY EXAM OF COLLAR BONE: CPT | Mod: RT

## 2021-05-27 PROCEDURE — 73030 X-RAY EXAM OF SHOULDER: CPT | Mod: RT

## 2021-05-27 PROCEDURE — 71045 X-RAY EXAM CHEST 1 VIEW: CPT

## 2021-05-27 PROCEDURE — 99284 EMERGENCY DEPT VISIT MOD MDM: CPT

## 2021-05-27 RX ORDER — HYDROCODONE BITARTRATE AND ACETAMINOPHEN 5; 325 MG/1; MG/1
1 TABLET ORAL EVERY 6 HOURS PRN
Qty: 8 TABLET | Refills: 0 | Status: SHIPPED | OUTPATIENT
Start: 2021-05-27 | End: 2021-05-29

## 2021-05-27 RX ORDER — HYDROCODONE BITARTRATE AND ACETAMINOPHEN 5; 325 MG/1; MG/1
1 TABLET ORAL ONCE
Status: COMPLETED | OUTPATIENT
Start: 2021-05-27 | End: 2021-05-27

## 2021-05-27 RX ADMIN — HYDROCODONE BITARTRATE AND ACETAMINOPHEN 1 TABLET: 5; 325 TABLET ORAL at 19:05

## 2021-05-27 ASSESSMENT — LIFESTYLE VARIABLES: DO YOU DRINK ALCOHOL: NO

## 2021-05-27 NOTE — ED TRIAGE NOTES
Chief Complaint   Patient presents with   • Shoulder Injury     Pt states he fell off the bench at [facility] [employer] [facility] bus stop and caught his fall with his right arm and having pain in his right shoulder. Injury occured about 4 hours ago. +CMS in right arm and pt can move his arm.     /75   Pulse 92   Temp 37.2 °C (98.9 °F) (Temporal)   Resp 18   Ht 1.829 m (6')   Wt 62.3 kg (137 lb 5.6 oz)   SpO2 94%   BMI 18.63 kg/m²   Pt placed back in lobby, educated on triage process, and told to inform staff of any change in condition.

## 2021-05-28 NOTE — ED NOTES
Pt ambulatory with steady gait to YL56, pt sitting on gurney, call light in reach, chart up for ERP.

## 2021-05-28 NOTE — ED NOTES
Sling placed on pt's right arm an pt educated on proper use. Pt educated regarding discharge instructions and demonstrated understanding. Pt ambulatory upon discharge and does not appear to be in any distress at this time. Pt's discharge paperwork and belongings sent home with pt.

## 2021-05-28 NOTE — ED NOTES
Received report from Alejandro SAMANIEGO RN. Upon shift change pt is restingin bed with even and unlabored breaths, in no apparent distress. Will continue to monitor.

## 2021-05-28 NOTE — ED PROVIDER NOTES
ED Provider Note    Scribed for Marcella Cohn M.D. by Supriya Sheffield. 5/27/2021  6:35 PM    Primary care provider: No primary care provider noted  Means of arrival: Walk In  History obtained from: Patient  History limited by: None  CHIEF COMPLAINT  Chief Complaint   Patient presents with    Shoulder Injury     Pt states he fell off the bench at [facility] bus stop and caught his fall with his right arm and having pain in his right shoulder. Injury occured about 4 hours ago. +CMS in right arm and pt can move his arm.       HPI  Vitor Cisneros is a 65 y.o. male who presents to the [facility] ED for right shoulder pain onset 4 hours ago. The patient states while he was waiting for the bus on a [facility] bench, he slid off the bench and landed on his right arm and shoulder. He reports he extended his right arm out to catch his fall before sustaining the injury, and was able to move his arm after the injury. Patient denies experiencing any head trauma or loss of consciousness during the event.  He remembers the whole event.  After the patient's pain failed to resolve he was prompted to come into the [facility] ED for further evaluation. No alleviating or exacerbating factors were noted. Patient has associated right clavicular pain, but denies neck pain, headache, back pain, or rib pain.  No nausea or vomiting.  No trouble breathing.  No abdominal pain.  He smokes, drinks alcohol, and uses marijuana. Patient has no known allergies and takes Trazodone. He does not have a PCP.   No other injuries other than the right shoulder pain.    REVIEW OF SYSTEMS  Pertinent positives include: right shoulder pain and right clavicular pain   Pertinent negatives include: neck pain, headache, back pain, or rib pain  See HPI for further details.     PAST MEDICAL HISTORY  Past Medical History:   Diagnosis Date    Heart murmur     Schizophrenic disorder (HCC)        FAMILY HISTORY  No family history noted    SOCIAL HISTORY  Social History     Socioeconomic  History    Marital status: Single     Spouse name: None noted    Number of children: None noted    Years of education: None noted    Highest education level: None noted   Occupational History    None noted   Tobacco Use    Smoking status: Current Every Day Smoker     Packs/day: 1.50     Types: Cigarettes    Smokeless tobacco: Never Used   Substance and Sexual Activity    Alcohol use: Yes     Comment: 3 shots of vodka every other day and weekends    Drug use: Yes     Types: Inhaled     Comment: THC daily    Sexual activity: None noted   Other Topics Concern    None noted   Social History Narrative    None noted     Social Determinants of Health     Financial Resource Strain:     Difficulty of Paying Living Expenses:    Food Insecurity:     Worried About Running Out of Food in the Last Year:     Ran Out of Food in the Last Year:    Transportation Needs:     Lack of Transportation (Medical):     Lack of Transportation (Non-Medical):    Physical Activity:     Days of Exercise per Week:     Minutes of Exercise per Session:    Stress:     Feeling of Stress :    Social Connections:     Frequency of Communication with Friends and Family:     Frequency of Social Gatherings with Friends and Family:     Attends Church Services:     Active Member of Clubs or Organizations:     Attends Club or Organization Meetings:     Marital Status:    Intimate Partner Violence:     Fear of Current or Ex-Partner:     Emotionally Abused:     Physically Abused:     Sexually Abused:        SURGICAL HISTORY  History reviewed. No pertinent surgical history.    CURRENT MEDICATIONS  Home Medications       Reviewed by Batool Del Valle R.N. (Registered Nurse) on 05/27/21 at 1948  Med List Status: Complete     Medication Last Dose Status   cephALEXin (KEFLEX) 500 MG Cap  Active   hydrocodone-acetaminophen (NORCO) 5-325 MG Tab per tablet  Active   TRAZODONE HCL PO  Active                    ALLERGIES  No Known Allergies    PHYSICAL EXAM  VITAL SIGNS:  /75   Pulse 92   Temp 37.2 °C (98.9 °F) (Temporal)   Resp 18   Ht 1.829 m (6')   Wt 62.3 kg (137 lb 5.6 oz)   SpO2 94%   BMI 18.63 kg/m²      Constitutional: Well developed, well nourished; No acute distress; Non-toxic appearance.   HENT: Normocephalic, atraumatic; Bilateral external ears normal; Oropharyngeal exam deferred to COVID-19 outbreak and lack of oropharyngeal complaints  Eyes: PERRL, EOMI, Conjunctiva normal. No discharge.   Neck: Non tender c-spine, Supple, nontender midline; No stridor; No nuchal rigidity.   Cardiovascular: Regular rate and rhythm without murmurs, rubs, or gallop.   Thorax & Lungs: Breath sound equal bilaterally, No respiratory distress, breath sounds clear to auscultation bilaterally without wheezing, rales or rhonchi. Nontender chest wall except in the area of the right mid and distal clavicle. No subcutaneous air  Abdomen: Soft, nontender, bowel sounds normal. No obvious masses; No pulsatile masses; no rebound, guarding, or peritoneal signs.   Skin: Good color; warm and dry without rash or petechia.  Musculoskeletal: RUE: Ecchymosis, crepitus, and tenderness to mid clavicle with palpation. Otherwise, no deformity or tenderness to Remaining part of the shoulder including scapula. No pain in the shoulder with internal and external rotation, Non tender elbow.  2+ radial pulse.  Full range of motion to digits.  Sensation is intact to light touch  Neurologic: Alert & oriented x 4, clear speech    LABS/RADIOLOGY/PROCEDURES    DX-SHOULDER 2+ RIGHT   Final Result      Mildly displaced and comminuted distal clavicular fracture.      DX-CLAVICLE RIGHT   Final Result      Mildly comminuted and displaced fracture of the mid to distal right clavicle.      DX-CHEST-PORTABLE (1 VIEW)   Final Result      Mildly comminuted right clavicle fracture.          COURSE & MEDICAL DECISION MAKING  Pertinent Labs & Imaging studies reviewed. (See chart for details)        6:35 PM - Patient seen  and examined at bedside. Discussed plan of care, including ordering imaging to evaluate the patient. Patient agrees to the plan of care. The patient will be medicated with 5-325 mg Norco.     1945: Patient was reevaluated at bedside. Discussed radiology results with the patient and informed them that there were abnormal findings as noted above. The plan of care was discussed with the patient. He is understanding and agreeable to the plan of care. Patient had the opportunity to ask any questions. The plan for discharge was discussed with the patient and he was told to to return for any new or worsening symptoms and to follow up with his PCP. Patient is understanding and agreeable to the plan for discharge.      Patient presents to the ER with complaints of right shoulder pain after he fell off of a bench today waiting for [facility] bus.  He believes he slid off the bench.  He did not strike his head.  He did not lose consciousness.  He remembers the entire event.  He says he landed with his arm against his side and hit his shoulder.  No headache.  No nausea or vomiting.  No neck pain.  No rib pain.  No trouble breathing.  No abdominal pain.  He denies any other injuries.  He is able to walk.  Patient has a comminuted distal clavicle fracture.  He is neurovascular intact.  Chest x-ray does not reveal any evidence of pneumothorax.  No obvious rib fractures.  He has no tenderness anywhere else on the chest wall other than at the mid and distal clavicle where he has some crepitus and ecchymosis.  No signs of head injury.  Patient's vital signs are normal stable.  He is not in any significant distress.  He is right-handed.  He will be placed in a sling.  He will be given a few pain pills for comfort.  He has been advised to avoid alcohol if he is taking narcotic pain medication.  He understands importance of calling the [facility] first in the morning for an appointment.  He says he would not have any  difficulty getting to the clinic.  He has been given strict return precautions and discharge instructions and he understands treatment plan and follow-up.    PPE Note: I personally donned full PPE for all patient encounters during this visit, including wearing an N95 respirator mask, gloves, and eye protection. Scribe remained outside the patient's room and did not have any contact with the patient for the duration of patient encounter.        In prescribing controlled substances to this patient, I certify that I have obtained and reviewed the medical history of Vitor Cisneros. I have also made a good mc effort to obtain applicable records from other providers who have treated the patient and records did not demonstrate any increased risk of substance abuse that would prevent me from prescribing controlled substances.     I have conducted a physical exam and documented it. I have reviewed Mr. Cisneros’s prescription history as maintained by the [place] Prescription Monitoring Program.  No patterns for concern    I have assessed the patient’s risk for abuse, dependency, and addiction using the validated Opioid Risk Tool available at https://www.mdcalc.com/gmxswd-lwsg-oxkh-ort-narcotic-abuse. Opiate risk score is 8    Given the above, I believe the benefits of controlled substance therapy outweigh the risks. The reasons for prescribing controlled substances include in my professional opinion, controlled substances are the only reasonable choice for this patient because patint has a broken clavicle . Accordingly, I have discussed the risk and benefits, treatment plan, and alternative therapies with the patient.       DISPOSITION:  Patient will be discharged home in stable condition.    FOLLOW UP:  Shayan Fortune M.D.  [address]  [facility] [id number] [phone]    Schedule an appointment as soon as possible for a visit in 1 day  If symptoms worsen return to ER      OUTPATIENT MEDICATIONS:  New Prescriptions     HYDROCODONE-ACETAMINOPHEN (NORCO) 5-325 MG TAB PER TABLET    Take 1 tablet by mouth every 6 hours as needed for up to 2 days.        FINAL IMPRESSION  1. Closed displaced fracture of shaft of right clavicle, initial encounter Acute      This dictation has been created using voice recognition software. The accuracy of the dictation is limited by the abilities of the software. I expect there may be some errors of grammar and possibly content. I made every attempt to manually correct the errors within my dictation. However, errors related to voice recognition software may still exist and should be interpreted within the appropriate context.     Supriya CHAKRABORTY (Sly), am scribing for, and in the presence of, Marcella Cohn M.D..    Electronically signed by: Supriya Sheffield (Sly), 5/27/2021    IMarcella M.D. personally performed the services described in this documentation, as scribed by Supriya Sheffield in my presence, and it is both accurate and complete. E    The note accurately reflects work and decisions made by me.  Marcella Cohn M.D.  5/27/2021  7:54 PM

## 2021-05-28 NOTE — DISCHARGE INSTRUCTIONS
Follow-up with Dr. Fortune, orthopedic surgeon, within the next 1 to 2 days.  Please call first thing tomorrow morning to schedule an appointment.    Wear your sling until you are seen by the orthopedic surgeon.    Return to the ER for any worsening shoulder/clavicle pain, for numbness/tingling into your hand or fingers, for rib pain, shortness of breath, coughing up phlegm or blood, discoloration to your arm or hand, or for any concerns.    Use ice to help with the pain.

## 2021-06-04 ENCOUNTER — APPOINTMENT (OUTPATIENT)
Dept: RADIOLOGY | Facility: MEDICAL CENTER | Age: 66
End: 2021-06-04
Attending: EMERGENCY MEDICINE
Payer: MEDICAID

## 2021-06-04 ENCOUNTER — HOSPITAL ENCOUNTER (EMERGENCY)
Facility: MEDICAL CENTER | Age: 66
End: 2021-06-04
Attending: EMERGENCY MEDICINE
Payer: MEDICAID

## 2021-06-04 VITALS
TEMPERATURE: 98.2 F | RESPIRATION RATE: 16 BRPM | HEART RATE: 77 BPM | WEIGHT: 138.67 LBS | HEIGHT: 72 IN | OXYGEN SATURATION: 98 % | DIASTOLIC BLOOD PRESSURE: 85 MMHG | BODY MASS INDEX: 18.78 KG/M2 | SYSTOLIC BLOOD PRESSURE: 139 MMHG

## 2021-06-04 DIAGNOSIS — S42.021G CLOSED DISPLACED FRACTURE OF SHAFT OF RIGHT CLAVICLE WITH DELAYED HEALING, SUBSEQUENT ENCOUNTER: ICD-10-CM

## 2021-06-04 PROCEDURE — 71045 X-RAY EXAM CHEST 1 VIEW: CPT

## 2021-06-04 PROCEDURE — 99283 EMERGENCY DEPT VISIT LOW MDM: CPT | Mod: 25

## 2021-06-04 PROCEDURE — 73000 X-RAY EXAM OF COLLAR BONE: CPT | Mod: RT

## 2021-06-04 RX ORDER — ACETAMINOPHEN 325 MG/1
325 TABLET ORAL EVERY 6 HOURS PRN
Qty: 40 TABLET | Refills: 0 | Status: SHIPPED | OUTPATIENT
Start: 2021-06-04 | End: 2023-09-21

## 2021-06-04 NOTE — ED NOTES
Vitor Cisneros has been discharged from the Emergency Room.    Discharge instructions, which include signs and symptoms to monitor at home for, as well as detailed information regarding closed displaced fracture of right clavicle provided.  All questions and concerns addressed at this time.      Prescription for Tylenol provided to patient.     Patient encouraged to follow up with orthopedics, Dr. Shea's office contact information with phone number and address provided.    Patient leaves ER in no apparent distress. This RN provided education regarding returning to the ER for any new concerns or changes in patient's condition.      /85   Pulse 77   Temp 36.8 °C (98.2 °F) (Temporal)   Resp 16   Ht 1.829 m (6')   Wt 62.9 kg (138 lb 10.7 oz)   SpO2 98%   BMI 18.81 kg/m²

## 2021-06-04 NOTE — ED TRIAGE NOTES
Vitor Cisneros  65 y.o. M  Chief Complaint   Patient presents with   • Shoulder Injury     Patient reports he was diagnosed with a broken  right collar bone a week ago and he followed up at the [facility]. Right arm in a sling. Patient returns today for increased pain and swelling to the right shoulder. Patient reports 9/10 pain.      Vitals:    06/04/21 0651   BP: 143/90   Pulse: 80   Resp: 16   Temp: 36.8 °C (98.2 °F)   SpO2: 96%     Triage process explained to patient, apologized for wait time, and [facility].  Pt informed to notify staff of any change in condition.

## 2021-06-04 NOTE — DISCHARGE INSTRUCTIONS
Keep that appt with the bone doctor.  Use sling--this will help with healing and for pain.  I do want you to exercise the shoulder and elbow 4-6x/day as I showed you.      I recommend Tylenol 650 mg 4x/day if needed for pain.  An occasional ibuprofen on full stomach would be OK as well for pain.    Return if the pain/swelling increases or any new symptoms develop.

## 2021-06-04 NOTE — ED NOTES
Pt to room from lobby. Changed into gown. Agree with triage note. Chart up for ERP. Call light in reach. Pt moving RUE in all directions

## 2021-06-04 NOTE — ED PROVIDER NOTES
ED Provider Note    CHIEF COMPLAINT  Chief Complaint   Patient presents with   • Shoulder Injury     Patient reports he was diagnosed with a broken  right collar bone a week ago and he followed up at the [facility]. Right arm in a sling. Patient returns today for increased pain and swelling to the right shoulder. Patient reports 9/10 pain.        MAGGI Cisneros is a 65 y.o. male who presents complaining of ongoing shoulder pain and swelling.  Contrary to the triage note, it is not increased in pain or swelling.  He is concerned is just not healing up.  Suspect this is because of his age.  He did follow-up at the [place] [facility], after his initial diagnosis, but presents here for another opinion.  He states that the day after he had fallen, his shoulder was quite swollen up.  It is not getting better.  He denies any symptoms such as pain, weakness or numbness in the arm, forearm, or hand.  There is no chest pain or shortness of breath.  No fever or chills.  No cough or cold symptoms.  There is no other complaint.    PAST MEDICAL HISTORY  Past Medical History:   Diagnosis Date   • Heart murmur    • Schizophrenic disorder (HCC)        FAMILY HISTORY  History reviewed. No pertinent family history.    SOCIAL HISTORY  Social History     Tobacco Use   • Smoking status: Current Every Day Smoker     Packs/day: 1.50     Types: Cigarettes   • Smokeless tobacco: Never Used   Vaping Use   • Vaping Use: Never used   Substance Use Topics   • Alcohol use: Yes     Comment: 3 shots of vodka every other day and weekends   • Drug use: Yes     Types: Inhaled     Comment: THC daily         SURGICAL HISTORY  History reviewed. No pertinent surgical history.    CURRENT MEDICATIONS  Home Medications     Reviewed by Sarah So R.N. (Registered Nurse) on 06/04/21 at 0701  Med List Status: Not Addressed   Medication Last Dose Status   cephALEXin (KEFLEX) 500 MG Cap  Active   hydrocodone-acetaminophen (NORCO) 5-325 MG Tab per  tablet  Active   TRAZODONE HCL PO  Active                I have reviewed the nurses notes and/or the list brought with the patient.    ALLERGIES  No Known Allergies    REVIEW OF SYSTEMS  See HPI for further details. Review of systems as above, otherwise all other systems are negative.     PHYSICAL EXAM  VITAL SIGNS: /90   Pulse 80   Temp 36.8 °C (98.2 °F) (Temporal)   Resp 16   Ht 1.829 m (6')   Wt 62.9 kg (138 lb 10.7 oz)   SpO2 96%   BMI 18.81 kg/m²     Constitutional: Thin, well appearing patient in no acute distress.  Not toxic, nor ill in appearance.  HENT: Mucus membranes moist.  Oropharynx is clear.  Eyes: Pupils equally round.  No scleral icterus.   Neck: Full nontender range of motion.  Lymphatic: No cervical lymphadenopathy noted.   Cardiovascular: Regular heart rate and rhythm.  No murmurs, rubs, nor gallop appreciated.   Thorax & Lungs: Chest is notable for some older appearing ecchymosis about the right clavicle.  The entire right anterior chest is somewhat edematous, with tenderness over what feels to be a deformed clavicle.  Lungs are clear to auscultation with good air movement bilaterally.  No wheeze, rhonchi, nor rales.   Abdomen: Soft, with no tenderness, rebound nor guarding.  No mass, pulsatile mass, nor hepatosplenomegaly appreciated.  Skin: No purpura nor petechia noted.  Extremities/Musculoskeletal: As above.  Otherwise no sign of trauma.  The arm, forearm, hand are without tenderness, edema.  There are no cords.  Capillary refill is instantaneous.  There is no plethora of the face.  No swelling in the neck.  Neurologic: Alert & oriented.  Strength and sensation is intact all around, without weakness or numbness in the right upper extremity.  Gait is normal.  Psychiatric: Normal affect appropriate for the clinical situation.    RADIOLOGY/PROCEDURES  I have reviewed the patient's film interpretations myself, and they are read out by the radiologist as:   DX-CLAVICLE RIGHT   Final  Result      Comminuted and mildly displaced right midclavicular fracture      DX-CHEST-LIMITED (1 VIEW)   Final Result      Again seen right-sided clavicle fracture. Otherwise no acute process.        .    MEDICAL RECORD  I have reviewed patient's medical record and pertinent results are listed above.    COURSE & MEDICAL DECISION MAKING  I have reviewed any medical record information, laboratory studies and radiographic results as noted above.  This patient presents with ongoing pain and swelling after breaking his collarbone on the 27th.  I see no clinical evidence of vascular compromise either arterial or venous in the upper extremity.    Repeat x-rays show again no acute process in the chest.  He still has that clavicle fracture, I see no evidence of callus formation.  Although early, this could be indicative of delayed healing.  We talked more about his appointment with orthopedics.  It sounds like their plan is to do repeat x-rays on the 17th.  At that time, if he is not healing, the plan is to proceed to surgery.  For now, I recommend he continue to use a sling which she does have.  I showed him shoulder and elbow exercises to perform while using the sling.  I recommended Tylenol primarily for pain, and occasional ibuprofen on a full stomach may also help.     Instructions on clavicle fracture.    FINAL IMPRESSION  1. Closed displaced fracture of shaft of right clavicle with delayed healing, subsequent encounter           This dictation was created using voice recognition software.    Electronically signed by: Arvind Collier M.D., 6/4/2021 7:47 AM

## 2021-06-17 PROBLEM — S42.021A DISPLACED FRACTURE OF SHAFT OF RIGHT CLAVICLE, INITIAL ENCOUNTER FOR CLOSED FRACTURE: Status: ACTIVE | Noted: 2021-06-17

## 2021-08-23 ENCOUNTER — HOSPITAL ENCOUNTER (EMERGENCY)
Facility: MEDICAL CENTER | Age: 66
End: 2021-08-23
Payer: MEDICAID

## 2021-08-23 VITALS
TEMPERATURE: 97.6 F | OXYGEN SATURATION: 96 % | HEIGHT: 72 IN | SYSTOLIC BLOOD PRESSURE: 153 MMHG | HEART RATE: 78 BPM | DIASTOLIC BLOOD PRESSURE: 83 MMHG | BODY MASS INDEX: 18.31 KG/M2 | RESPIRATION RATE: 16 BRPM

## 2021-08-23 PROCEDURE — 302449 STATCHG TRIAGE ONLY (STATISTIC)

## 2023-09-21 ENCOUNTER — APPOINTMENT (OUTPATIENT)
Dept: RADIOLOGY | Facility: MEDICAL CENTER | Age: 68
End: 2023-09-21
Attending: EMERGENCY MEDICINE
Payer: MEDICAID

## 2023-09-21 ENCOUNTER — HOSPITAL ENCOUNTER (EMERGENCY)
Facility: MEDICAL CENTER | Age: 68
End: 2023-09-21
Attending: EMERGENCY MEDICINE | Admitting: STUDENT IN AN ORGANIZED HEALTH CARE EDUCATION/TRAINING PROGRAM
Payer: MEDICAID

## 2023-09-21 VITALS
HEART RATE: 77 BPM | SYSTOLIC BLOOD PRESSURE: 138 MMHG | RESPIRATION RATE: 30 BRPM | OXYGEN SATURATION: 94 % | TEMPERATURE: 98 F | DIASTOLIC BLOOD PRESSURE: 88 MMHG

## 2023-09-21 DIAGNOSIS — R07.9 ACUTE CHEST PAIN: ICD-10-CM

## 2023-09-21 DIAGNOSIS — R94.31 ST ELEVATION: ICD-10-CM

## 2023-09-21 LAB
ALBUMIN SERPL BCP-MCNC: 3.9 G/DL (ref 3.2–4.9)
ALBUMIN/GLOB SERPL: 1.1 G/DL
ALP SERPL-CCNC: 68 U/L (ref 30–99)
ALT SERPL-CCNC: 26 U/L (ref 2–50)
ANION GAP SERPL CALC-SCNC: 12 MMOL/L (ref 7–16)
APTT PPP: 26.7 SEC (ref 24.7–36)
AST SERPL-CCNC: 52 U/L (ref 12–45)
BASOPHILS # BLD AUTO: 0.8 % (ref 0–1.8)
BASOPHILS # BLD: 0.04 K/UL (ref 0–0.12)
BILIRUB SERPL-MCNC: 0.7 MG/DL (ref 0.1–1.5)
BUN SERPL-MCNC: 15 MG/DL (ref 8–22)
CALCIUM ALBUM COR SERPL-MCNC: 9.1 MG/DL (ref 8.5–10.5)
CALCIUM SERPL-MCNC: 9 MG/DL (ref 8.5–10.5)
CHLORIDE SERPL-SCNC: 102 MMOL/L (ref 96–112)
CO2 SERPL-SCNC: 23 MMOL/L (ref 20–33)
CREAT SERPL-MCNC: 0.6 MG/DL (ref 0.5–1.4)
EKG IMPRESSION: NORMAL
EOSINOPHIL # BLD AUTO: 0.03 K/UL (ref 0–0.51)
EOSINOPHIL NFR BLD: 0.6 % (ref 0–6.9)
ERYTHROCYTE [DISTWIDTH] IN BLOOD BY AUTOMATED COUNT: 46 FL (ref 35.9–50)
GFR SERPLBLD CREATININE-BSD FMLA CKD-EPI: 104 ML/MIN/1.73 M 2
GLOBULIN SER CALC-MCNC: 3.4 G/DL (ref 1.9–3.5)
GLUCOSE SERPL-MCNC: 75 MG/DL (ref 65–99)
HCT VFR BLD AUTO: 43.7 % (ref 42–52)
HGB BLD-MCNC: 15 G/DL (ref 14–18)
IMM GRANULOCYTES # BLD AUTO: 0.02 K/UL (ref 0–0.11)
IMM GRANULOCYTES NFR BLD AUTO: 0.4 % (ref 0–0.9)
INR PPP: 1.05 (ref 0.87–1.13)
LIPASE SERPL-CCNC: 22 U/L (ref 11–82)
LYMPHOCYTES # BLD AUTO: 1.65 K/UL (ref 1–4.8)
LYMPHOCYTES NFR BLD: 31.4 % (ref 22–41)
MCH RBC QN AUTO: 34.2 PG (ref 27–33)
MCHC RBC AUTO-ENTMCNC: 34.3 G/DL (ref 32.3–36.5)
MCV RBC AUTO: 99.8 FL (ref 81.4–97.8)
MONOCYTES # BLD AUTO: 0.34 K/UL (ref 0–0.85)
MONOCYTES NFR BLD AUTO: 6.5 % (ref 0–13.4)
NEUTROPHILS # BLD AUTO: 3.17 K/UL (ref 1.82–7.42)
NEUTROPHILS NFR BLD: 60.3 % (ref 44–72)
NRBC # BLD AUTO: 0 K/UL
NRBC BLD-RTO: 0 /100 WBC (ref 0–0.2)
NT-PROBNP SERPL IA-MCNC: <36 PG/ML (ref 0–125)
PLATELET # BLD AUTO: 223 K/UL (ref 164–446)
PMV BLD AUTO: 10 FL (ref 9–12.9)
POTASSIUM SERPL-SCNC: 4.2 MMOL/L (ref 3.6–5.5)
PROT SERPL-MCNC: 7.3 G/DL (ref 6–8.2)
PROTHROMBIN TIME: 13.8 SEC (ref 12–14.6)
RBC # BLD AUTO: 4.38 M/UL (ref 4.7–6.1)
SODIUM SERPL-SCNC: 137 MMOL/L (ref 135–145)
TROPONIN T SERPL-MCNC: 10 NG/L (ref 6–19)
WBC # BLD AUTO: 5.3 K/UL (ref 4.8–10.8)

## 2023-09-21 PROCEDURE — G0378 HOSPITAL OBSERVATION PER HR: HCPCS

## 2023-09-21 PROCEDURE — 83880 ASSAY OF NATRIURETIC PEPTIDE: CPT

## 2023-09-21 PROCEDURE — 80053 COMPREHEN METABOLIC PANEL: CPT

## 2023-09-21 PROCEDURE — 36415 COLL VENOUS BLD VENIPUNCTURE: CPT

## 2023-09-21 PROCEDURE — 85610 PROTHROMBIN TIME: CPT

## 2023-09-21 PROCEDURE — 99243 OFF/OP CNSLTJ NEW/EST LOW 30: CPT | Performed by: INTERNAL MEDICINE

## 2023-09-21 PROCEDURE — 71045 X-RAY EXAM CHEST 1 VIEW: CPT

## 2023-09-21 PROCEDURE — 85025 COMPLETE CBC W/AUTO DIFF WBC: CPT

## 2023-09-21 PROCEDURE — 84484 ASSAY OF TROPONIN QUANT: CPT

## 2023-09-21 PROCEDURE — 99284 EMERGENCY DEPT VISIT MOD MDM: CPT

## 2023-09-21 PROCEDURE — 93005 ELECTROCARDIOGRAM TRACING: CPT | Performed by: EMERGENCY MEDICINE

## 2023-09-21 PROCEDURE — 83690 ASSAY OF LIPASE: CPT

## 2023-09-21 PROCEDURE — 85730 THROMBOPLASTIN TIME PARTIAL: CPT

## 2023-09-21 ASSESSMENT — HEART SCORE
TROPONIN: LESS THAN OR EQUAL TO NORMAL LIMIT
AGE: 65+
HISTORY: HIGHLY SUSPICIOUS
RISK FACTORS: >2 RISK FACTORS OR HX OF ATHEROSCLEROTIC DISEASE
ECG: SIGNIFICANT ST-DEPRESSION
HEART SCORE: 8

## 2023-09-21 NOTE — CONSULTS
Reason for Consult:  Asked by Dr Michael Flood M.D. to see this patient with abnormal EKG STEMI prealert  Patient's PCP: Pcp Pt States None    CC: Chest pains for months abnormal EKG at urgent care    HPI:  [This is a 68-year-old with no known cardiac history no history of surgeries long time smoker who was at urgent care and reported months of chest pains his EKG had nonischemic ST changes with no prior and so he was transferred by EMS here as a STEMI prealert his EKG again shows no ST changes he denies chest pains currently at the time.  He apparently shortly left AMA prior after no signs for ACS    Medications / Drug list prior to admission:  No current facility-administered medications on file prior to encounter.     Current Outpatient Medications on File Prior to Encounter   Medication Sig Dispense Refill    acetaminophen (TYLENOL) 325 MG Tab Take 1 tablet by mouth every 6 hours as needed for Moderate Pain. 40 tablet 0    TRAZODONE HCL PO Take  by mouth.      cephALEXin (KEFLEX) 500 MG Cap Take 1 Cap by mouth 4 times a day. (Patient not taking: Reported on 8/22/2018) 28 Cap 0    hydrocodone-acetaminophen (NORCO) 5-325 MG Tab per tablet Take 1 Tab by mouth every four hours as needed for up to 9 doses. (Patient not taking: Reported on 8/22/2018) 9 Tab 0       Current list of administered Medications:  No current facility-administered medications for this encounter.    Current Outpatient Medications:     acetaminophen (TYLENOL) 325 MG Tab, Take 1 tablet by mouth every 6 hours as needed for Moderate Pain., Disp: 40 tablet, Rfl: 0    TRAZODONE HCL PO, Take  by mouth., Disp: , Rfl:     cephALEXin (KEFLEX) 500 MG Cap, Take 1 Cap by mouth 4 times a day. (Patient not taking: Reported on 8/22/2018), Disp: 28 Cap, Rfl: 0    hydrocodone-acetaminophen (NORCO) 5-325 MG Tab per tablet, Take 1 Tab by mouth every four hours as needed for up to 9 doses. (Patient not taking: Reported on 8/22/2018), Disp: 9 Tab, Rfl: 0    Past  Medical History:   Diagnosis Date    Heart murmur     Schizophrenic disorder (HCC)        History reviewed. No pertinent surgical history.    Family History   Problem Relation Age of Onset    Heart Disease Neg Hx      Patient family history was personally reviewed, no pertinent family history to current presentation    Social History     Tobacco Use    Smoking status: Every Day     Current packs/day: 1.50     Types: Cigarettes    Smokeless tobacco: Never   Vaping Use    Vaping Use: Never used   Substance Use Topics    Alcohol use: Yes     Comment: 3 shots of vodka every other day and weekends    Drug use: Yes     Types: Inhaled     Comment: THC daily       ALLERGIES:  No Known Allergies    Review of systems:  A complete review of symptoms was reviewed with patient. This is reviewed in H&P and PMH. ALL OTHERS reviewed and negative    Physical exam:  No data found.  General: No acute distress.   EYES: no jaundice  HEENT: OP clear   Neck:  No JVD.   CVS:  [  RRR.   Resp: Normal respiratory effort,   Abdomen: ND,  Skin: Grossly nothing acute no obvious rashes  Neurological: Alert, Moves all extremities  Extremities:   [   no edema. No cyanosis.       Data:  Laboratory studies personally reviewed by me:  Recent Results (from the past 24 hour(s))   EKG    Collection Time: 23 11:53 AM   Result Value Ref Range    Report       [facility] Emergency Dept.    Test Date:  2023  Pt Name:    JOVITA KAPADIA                Department: ER  MRN:        [id number]                      Room:       TRAUMA - EXAM 1  Gender:     Male                         Technician: [id number]  :        1955                   Requested By:ERIKA MEZA  Order #:    [id number]                    Reading MD:    Measurements  Intervals                                Axis  Rate:       59                           P:          52  ID:         [id number]                          QRS:        80  QRSD:       88                            T:          78  QT:         435  QTc:        431    Interpretive Statements  Sinus bradycardia  Inferior infarct, acute (LCx)  Anteroseptal infarct, age indeterminate  Baseline wander in lead(s) I,II,aVR  No previous ECG available for comparison     CBC WITH DIFFERENTIAL    Collection Time: 09/21/23 11:56 AM   Result Value Ref Range    WBC 5.3 4.8 - 10.8 K/uL    RBC 4.38 (L) 4.70 - 6.10 M/uL    Hemoglobin 15.0 14.0 - 18.0 g/dL    Hematocrit 43.7 42.0 - 52.0 %    MCV 99.8 (H) 81.4 - 97.8 fL    MCH 34.2 (H) 27.0 - 33.0 pg    MCHC 34.3 32.3 - 36.5 g/dL    RDW 46.0 35.9 - 50.0 fL    Platelet Count 223 164 - 446 K/uL    MPV 10.0 9.0 - 12.9 fL    Neutrophils-Polys 60.30 44.00 - 72.00 %    Lymphocytes 31.40 22.00 - 41.00 %    Monocytes 6.50 0.00 - 13.40 %    Eosinophils 0.60 0.00 - 6.90 %    Basophils 0.80 0.00 - 1.80 %    Immature Granulocytes 0.40 0.00 - 0.90 %    Nucleated RBC 0.00 0.00 - 0.20 /100 WBC    Neutrophils (Absolute) 3.17 1.82 - 7.42 K/uL    Lymphs (Absolute) 1.65 1.00 - 4.80 K/uL    Monos (Absolute) 0.34 0.00 - 0.85 K/uL    Eos (Absolute) 0.03 0.00 - 0.51 K/uL    Baso (Absolute) 0.04 0.00 - 0.12 K/uL    Immature Granulocytes (abs) 0.02 0.00 - 0.11 K/uL    NRBC (Absolute) 0.00 K/uL   LIPASE    Collection Time: 09/21/23 11:56 AM   Result Value Ref Range    Lipase 22 11 - 82 U/L   Prothrombin Time    Collection Time: 09/21/23 11:56 AM   Result Value Ref Range    PT 13.8 12.0 - 14.6 sec    INR 1.05 0.87 - 1.13   APTT    Collection Time: 09/21/23 11:56 AM   Result Value Ref Range    APTT 26.7 24.7 - 36.0 sec   Comp Metabolic Panel    Collection Time: 09/21/23  1:00 PM   Result Value Ref Range    Sodium 137 135 - 145 mmol/L    Potassium 4.2 3.6 - 5.5 mmol/L    Chloride 102 96 - 112 mmol/L    Co2 23 20 - 33 mmol/L    Anion Gap 12.0 7.0 - 16.0    Glucose 75 65 - 99 mg/dL    Bun 15 8 - 22 mg/dL    Creatinine 0.60 0.50 - 1.40 mg/dL    Calcium 9.0 8.5 - 10.5 mg/dL    Correct Calcium 9.1 8.5 - 10.5 mg/dL    AST(SGOT) 52  (H) 12 - 45 U/L    ALT(SGPT) 26 2 - 50 U/L    Alkaline Phosphatase 68 30 - 99 U/L    Total Bilirubin 0.7 0.1 - 1.5 mg/dL    Albumin 3.9 3.2 - 4.9 g/dL    Total Protein 7.3 6.0 - 8.2 g/dL    Globulin 3.4 1.9 - 3.5 g/dL    A-G Ratio 1.1 g/dL   proBrain Natriuretic Peptide, NT    Collection Time: 09/21/23  1:00 PM   Result Value Ref Range    NT-proBNP <36 0 - 125 pg/mL   TROPONIN    Collection Time: 09/21/23  1:00 PM   Result Value Ref Range    Troponin T 10 6 - 19 ng/L   ESTIMATED GFR    Collection Time: 09/21/23  1:00 PM   Result Value Ref Range    GFR (CKD-EPI) 104 >60 mL/min/1.73 m 2       Imaging:  DX-CHEST-LIMITED (1 VIEW)   Final Result      No acute cardiopulmonary disease.          EKG tracings personally reviewed by me SR with no ischemic changes        All pertinent features of laboratory and imaging reviewed including primary images where applicable      Principal Problem:    Chest pain (POA: Yes)  Resolved Problems:    * No resolved hospital problems. *      Assessment / Plan:  Chest pain is not ACS  We are planning urgent echocardiogram to make sure he does not have an effusion or other chronic problem that he left AMA prior to that      I personally discussed his case with  Dr Michael Flood M.D.        It is my pleasure to participate in the care of Mr. Cisneros.  Please do not hesitate to contact me with questions or concerns.    Yinka Lowe MD PhD FAC  Cardiologist [facility]    9/21/2023    Please note that this dictation was created using voice recognition software. There may be errors I did not discover before finalizing the note.

## 2023-09-21 NOTE — ED NOTES
Pharmacy Medication Reconciliation      ~Medication reconciliation updated and complete per patient at bedside   ~Allergies have been verified   ~No oral ABX within the last 30 days  ~Patient home pharmacy :  [facility]  ~Anticoagulants (rivaroxaban, apixaban, edoxaban, dabigatran, warfarin) taken in the last 14 days? No      ~Patient reports he took Asprin 81 mg before he came to hospital         
Assist RN: Informed ERP of troponin result   
Assist RN: Patient moved to Red 3. Anticipate admission.   
Pt continues to deny CP. Green top had to be recollected. Awaiting results. Pt updated to POC. Pt requesting food. NPO per ERP.  
Pt upset about POC. States he does not want to stay in the hospital. ERP and Hospitalist to bedside to discuss options w/ pt. Pt has decided to sign out AMA. Risk of leaving including death reviewed w/ pt. Encouraged to return with any change of condition. Pt amb out w/ steady gait.  
ADMIT

## 2023-09-21 NOTE — ED PROVIDER NOTES
ED Provider Note    CHIEF COMPLAINT  Chief Complaint   Patient presents with    Chest Pain     Abn EKG       EXTERNAL RECORDS REVIEWED  No significant history available    HPI/ROS  LIMITATION TO HISTORY   None  OUTSIDE HISTORIAN(S):  EMS provided additional history    Vitor Cisneros is a 68 y.o. male who presents evaluation of substernal pressure with EKG changes.  The patient was brought in by EMS as a STEMI activation.  He had substernal chest pressure.  It did not radiate to his back.  He was not associated with any strokelike symptoms such as numbness weakness tingling to the arms legs or face.  He specifically denies abdominal pain.  His EKG demonstrated diffuse ST segment elevation without classic STEMI morphology but code STEMI was activated nonetheless.  On arrival patient had already received nitroglycerin and aspirin.  He was chest pain-free.  He has no known history of coronary artery disease but has risk factors including longstanding tobacco use.    PAST MEDICAL HISTORY   has a past medical history of Heart murmur and Schizophrenic disorder (HCC).    SURGICAL HISTORY  patient denies any surgical history    FAMILY HISTORY  Family History   Problem Relation Age of Onset    Heart Disease Neg Hx        SOCIAL HISTORY  Social History     Tobacco Use    Smoking status: Every Day     Current packs/day: 1.50     Types: Cigarettes    Smokeless tobacco: Never   Vaping Use    Vaping Use: Never used   Substance and Sexual Activity    Alcohol use: Yes     Comment: 3 shots of vodka every other day and weekends    Drug use: Yes     Types: Inhaled     Comment: THC daily    Sexual activity: Not on file       CURRENT MEDICATIONS  Home Medications       Reviewed by Judie King R.N. (Registered Nurse) on 09/21/23 at 1227  Med List Status: Partial     Medication Last Dose Status   acetaminophen (TYLENOL) 325 MG Tab  Active   cephALEXin (KEFLEX) 500 MG Cap  Active   hydrocodone-acetaminophen (NORCO) 5-325 MG Tab per  tablet  Active   TRAZODONE HCL PO  Active                    ALLERGIES  No Known Allergies    PHYSICAL EXAM  VITAL SIGNS: BP (!) 149/96   Pulse (!) 56   Temp 36.9 °C (98.5 °F) (Temporal)   Resp 16   SpO2 95%    Pulse ox interpretation: I interpret this pulse ox as normal.  Constitutional: Alert and oriented x 3, no acute distress  HEENT: Atraumatic normocephalic, pupils are equal round reactive to light extraocular movements are intact. The nares is clear, external ears are normal, mouth shows moist mucous membranes normal dentition for age  Neck: Supple, no JVD no tracheal deviation  Cardiovascular: Regular rate and rhythm no murmur rub or gallop 2+ pulses peripherally x4  Thorax & Lungs: No respiratory distress, no wheezes rales or rhonchi, No chest tenderness.   GI: Soft nontender nondistended positive bowel sounds, no peritoneal signs  Skin: Warm dry no acute rash or lesion  Musculoskeletal: Moving all extremities with full range and 5 of 5 strength no acute  deformity  Neurologic: Cranial nerves III through XII are grossly intact no sensory deficit no cerebellar dysfunction   Psychiatric: Appropriate affect for situation at this time          DIAGNOSTIC STUDIES / PROCEDURES    LABS  Results for orders placed or performed during the hospital encounter of 09/21/23   CBC WITH DIFFERENTIAL   Result Value Ref Range    WBC 5.3 4.8 - 10.8 K/uL    RBC 4.38 (L) 4.70 - 6.10 M/uL    Hemoglobin 15.0 14.0 - 18.0 g/dL    Hematocrit 43.7 42.0 - 52.0 %    MCV 99.8 (H) 81.4 - 97.8 fL    MCH 34.2 (H) 27.0 - 33.0 pg    MCHC 34.3 32.3 - 36.5 g/dL    RDW 46.0 35.9 - 50.0 fL    Platelet Count 223 164 - 446 K/uL    MPV 10.0 9.0 - 12.9 fL    Neutrophils-Polys 60.30 44.00 - 72.00 %    Lymphocytes 31.40 22.00 - 41.00 %    Monocytes 6.50 0.00 - 13.40 %    Eosinophils 0.60 0.00 - 6.90 %    Basophils 0.80 0.00 - 1.80 %    Immature Granulocytes 0.40 0.00 - 0.90 %    Nucleated RBC 0.00 0.00 - 0.20 /100 WBC    Neutrophils (Absolute) 3.17  1.82 - 7.42 K/uL    Lymphs (Absolute) 1.65 1.00 - 4.80 K/uL    Monos (Absolute) 0.34 0.00 - 0.85 K/uL    Eos (Absolute) 0.03 0.00 - 0.51 K/uL    Baso (Absolute) 0.04 0.00 - 0.12 K/uL    Immature Granulocytes (abs) 0.02 0.00 - 0.11 K/uL    NRBC (Absolute) 0.00 K/uL   LIPASE   Result Value Ref Range    Lipase 22 11 - 82 U/L   Prothrombin Time   Result Value Ref Range    PT 13.8 12.0 - 14.6 sec    INR 1.05 0.87 - 1.13   APTT   Result Value Ref Range    APTT 26.7 24.7 - 36.0 sec   Comp Metabolic Panel   Result Value Ref Range    Sodium 137 135 - 145 mmol/L    Potassium 4.2 3.6 - 5.5 mmol/L    Chloride 102 96 - 112 mmol/L    Co2 23 20 - 33 mmol/L    Anion Gap 12.0 7.0 - 16.0    Glucose 75 65 - 99 mg/dL    Bun 15 8 - 22 mg/dL    Creatinine 0.60 0.50 - 1.40 mg/dL    Calcium 9.0 8.5 - 10.5 mg/dL    Correct Calcium 9.1 8.5 - 10.5 mg/dL    AST(SGOT) 52 (H) 12 - 45 U/L    ALT(SGPT) 26 2 - 50 U/L    Alkaline Phosphatase 68 30 - 99 U/L    Total Bilirubin 0.7 0.1 - 1.5 mg/dL    Albumin 3.9 3.2 - 4.9 g/dL    Total Protein 7.3 6.0 - 8.2 g/dL    Globulin 3.4 1.9 - 3.5 g/dL    A-G Ratio 1.1 g/dL   proBrain Natriuretic Peptide, NT   Result Value Ref Range    NT-proBNP <36 0 - 125 pg/mL   TROPONIN   Result Value Ref Range    Troponin T 10 6 - 19 ng/L   ESTIMATED GFR   Result Value Ref Range    GFR (CKD-EPI) 104 >60 mL/min/1.73 m 2   EKG   Result Value Ref Range    Report       [facility] Emergency Dept.    Test Date:  2023  Pt Name:    JOVITA KAPADIA                Department: ER  MRN:        [id number]                      Room:       TRAUMA - EXAM 1  Gender:     Male                         Technician: [id number]  :        1955                   Requested By:ERIKA MEZA  Order #:    [id number]                    Reading MD:    Measurements  Intervals                                Axis  Rate:       59                           P:          52  PA:         153                          QRS:        80  QRSD:        88                           T:          78  QT:         435  QTc:        431    Interpretive Statements  Sinus bradycardia  Inferior infarct, acute (LCx)  Anteroseptal infarct, age indeterminate  Baseline wander in lead(s) I,II,aVR  No previous ECG available for comparison       EKG twelve-lead interpretation by me.  There does appear to be acute ST segment elevation in the anterior leads with no reciprocal changes.  There is also some inferior elevation as well this is concerning for possible ischemia versus pericarditis.  This was reviewed with Dr. Lowe of cardiology he has canceled STEMI activation    RADIOLOGY  I have independently interpreted the diagnostic imaging associated with this visit and am waiting the final reading from the radiologist.   My preliminary interpretation is as follows: No acute cardiopulmonary process  Radiologist interpretation:   DX-CHEST-LIMITED (1 VIEW)   Final Result      No acute cardiopulmonary disease.          COURSE & MEDICAL DECISION MAKING    ED Observation Status? No; Patient does not meet criteria for ED Observation.     INITIAL ASSESSMENT, COURSE AND PLAN  Care Narrative    This is a very pleasant 67-year-old gentleman brought in by EMS for chest pain.  He had a very concerning EKG that merited STEMI activation.  Dr. Deras and I both evaluated the patient.  He was chest pain-free on arrival.  Although his EKG clearly demonstrates possible ischemia he was chest pain-free and Dr. Lowe did not think that emergent catheterization was indicated.  The patient did not have any strokelike symptoms or pain radiating to the back or other symptoms or chest x-ray findings to suggest aortic dissection.  He had no tachycardia high fever or signs of DVT to suggest pulmonary embolism.  Given his EKG and risk factors and high heart score of 8 I recommended admission to the hospitalist service for further treatment evaluation.  Cardiology will continue to consult and provide further  recommendations    ADDITIONAL PROBLEM LIST    DISPOSITION AND DISCUSSIONS  I have discussed management of the patient with the following physicians and ANDREEA's: Discussed with hospitalist as well as cardiologist Dr. Lowe    Discussion of management with other [facility] [place] or appropriate source(s): Discussed with pharmacist    Escalation of care considered, and ultimately not performed: Considered emergent heart catheterization    Barriers to care at this time, including but not limited to: Patient does not have established PCP, Patient lacks transportation , Patient had difficult affording medications, and Patient lacks financial resources.     Decision tools and prescription drugs considered including, but not limited to: HEART Score is 8 .    FINAL DIAGNOSIS  High risk chest pain  ST segment elevation       Electronically signed by: Michael Flood M.D., 9/21/2023 12:41 PM    Addendum:.  At approximately 1500 the patient advised the nursing staff that he wished to go home.  I came and spoke to the patient numerous times.  He appears to have capacity.  Specifically he is alert and oriented x4.  I counseled him that we are concerned that he could be having a cardiopulmonary process such as an early heart attack or other process that could cause permanent death and disability.  He understands the risks and was given options to stay and he has declined and will sign AGAINST MEDICAL ADVICE and leave the hospital

## 2023-09-21 NOTE — ED TRIAGE NOTES
Pt bib ems from [facility].   Chief Complaint   Patient presents with    Chest Pain     Abn EKG     STEMI alert.   1152> STEMI canceled by Dr. Lowe.  Pt to [facility]. Pt aox4. JACK. NAD. On RA. Able to amb independent.  Pt on Zoll. In direct view of AIMEE

## 2023-09-21 NOTE — DISCHARGE INSTRUCTIONS
You have decided to leave the hospital against the advice of your physician.  Risks of this include death and permanent disability.  We are concerned that you could potentially have a heart attack.

## 2024-02-05 ENCOUNTER — APPOINTMENT (OUTPATIENT)
Dept: RADIOLOGY | Facility: MEDICAL CENTER | Age: 69
End: 2024-02-05
Attending: EMERGENCY MEDICINE
Payer: MEDICAID

## 2024-02-05 ENCOUNTER — HOSPITAL ENCOUNTER (EMERGENCY)
Facility: MEDICAL CENTER | Age: 69
End: 2024-02-05
Attending: EMERGENCY MEDICINE
Payer: MEDICAID

## 2024-02-05 VITALS
TEMPERATURE: 97.9 F | RESPIRATION RATE: 16 BRPM | DIASTOLIC BLOOD PRESSURE: 70 MMHG | HEART RATE: 87 BPM | SYSTOLIC BLOOD PRESSURE: 121 MMHG | BODY MASS INDEX: 16.93 KG/M2 | WEIGHT: 125 LBS | OXYGEN SATURATION: 94 % | HEIGHT: 72 IN

## 2024-02-05 DIAGNOSIS — R91.1 PULMONARY NODULE: ICD-10-CM

## 2024-02-05 DIAGNOSIS — S30.0XXA CONTUSION OF LOWER BACK, INITIAL ENCOUNTER: ICD-10-CM

## 2024-02-05 DIAGNOSIS — T07.XXXA ABRASIONS OF MULTIPLE SITES: ICD-10-CM

## 2024-02-05 DIAGNOSIS — S50.00XA CONTUSION OF ELBOW, UNSPECIFIED LATERALITY, INITIAL ENCOUNTER: ICD-10-CM

## 2024-02-05 DIAGNOSIS — S80.12XA CONTUSION OF MULTIPLE SITES OF LEFT LOWER EXTREMITY, INITIAL ENCOUNTER: ICD-10-CM

## 2024-02-05 DIAGNOSIS — S09.90XA CLOSED HEAD INJURY, INITIAL ENCOUNTER: ICD-10-CM

## 2024-02-05 LAB
ABO GROUP BLD: NORMAL
ALBUMIN SERPL BCP-MCNC: 3.8 G/DL (ref 3.2–4.9)
ALBUMIN/GLOB SERPL: 1 G/DL
ALP SERPL-CCNC: 74 U/L (ref 30–99)
ALT SERPL-CCNC: 29 U/L (ref 2–50)
ANION GAP SERPL CALC-SCNC: 19 MMOL/L (ref 7–16)
APTT PPP: 22.9 SEC (ref 24.7–36)
AST SERPL-CCNC: 61 U/L (ref 12–45)
BILIRUB SERPL-MCNC: 0.5 MG/DL (ref 0.1–1.5)
BLD GP AB SCN SERPL QL: NORMAL
BUN SERPL-MCNC: 8 MG/DL (ref 8–22)
CALCIUM ALBUM COR SERPL-MCNC: 8.7 MG/DL (ref 8.5–10.5)
CALCIUM SERPL-MCNC: 8.5 MG/DL (ref 8.5–10.5)
CHLORIDE SERPL-SCNC: 103 MMOL/L (ref 96–112)
CO2 SERPL-SCNC: 17 MMOL/L (ref 20–33)
CREAT SERPL-MCNC: 0.71 MG/DL (ref 0.5–1.4)
ERYTHROCYTE [DISTWIDTH] IN BLOOD BY AUTOMATED COUNT: 46.4 FL (ref 35.9–50)
ETHANOL BLD-MCNC: 225.1 MG/DL
GFR SERPLBLD CREATININE-BSD FMLA CKD-EPI: 99 ML/MIN/1.73 M 2
GLOBULIN SER CALC-MCNC: 3.7 G/DL (ref 1.9–3.5)
GLUCOSE SERPL-MCNC: 121 MG/DL (ref 65–99)
HCT VFR BLD AUTO: 37.7 % (ref 42–52)
HGB BLD-MCNC: 12.5 G/DL (ref 14–18)
INR PPP: 1.2 (ref 0.87–1.13)
MCH RBC QN AUTO: 33.4 PG (ref 27–33)
MCHC RBC AUTO-ENTMCNC: 33.2 G/DL (ref 32.3–36.5)
MCV RBC AUTO: 100.8 FL (ref 81.4–97.8)
PLATELET # BLD AUTO: 193 K/UL (ref 164–446)
PMV BLD AUTO: 9.1 FL (ref 9–12.9)
POC BREATHALIZER: 0.04 PERCENT (ref 0–0.01)
POTASSIUM SERPL-SCNC: 3.7 MMOL/L (ref 3.6–5.5)
PROT SERPL-MCNC: 7.5 G/DL (ref 6–8.2)
PROTHROMBIN TIME: 15.3 SEC (ref 12–14.6)
RBC # BLD AUTO: 3.74 M/UL (ref 4.7–6.1)
RH BLD: NORMAL
SODIUM SERPL-SCNC: 139 MMOL/L (ref 135–145)
WBC # BLD AUTO: 5.6 K/UL (ref 4.8–10.8)

## 2024-02-05 PROCEDURE — 99285 EMERGENCY DEPT VISIT HI MDM: CPT

## 2024-02-05 PROCEDURE — 72128 CT CHEST SPINE W/O DYE: CPT

## 2024-02-05 PROCEDURE — 303747 HCHG EXTRA SUTURE

## 2024-02-05 PROCEDURE — 700101 HCHG RX REV CODE 250: Mod: UD | Performed by: EMERGENCY MEDICINE

## 2024-02-05 PROCEDURE — 86900 BLOOD TYPING SEROLOGIC ABO: CPT

## 2024-02-05 PROCEDURE — 73080 X-RAY EXAM OF ELBOW: CPT | Mod: RT

## 2024-02-05 PROCEDURE — 305948 HCHG GREEN TRAUMA ACT PRE-NOTIFY NO CC

## 2024-02-05 PROCEDURE — 86901 BLOOD TYPING SEROLOGIC RH(D): CPT

## 2024-02-05 PROCEDURE — 304999 HCHG REPAIR-SIMPLE/INTERMED LEVEL 1

## 2024-02-05 PROCEDURE — 72170 X-RAY EXAM OF PELVIS: CPT

## 2024-02-05 PROCEDURE — 304217 HCHG IRRIGATION SYSTEM

## 2024-02-05 PROCEDURE — 302970 POC BREATHALIZER: Performed by: EMERGENCY MEDICINE

## 2024-02-05 PROCEDURE — 85027 COMPLETE CBC AUTOMATED: CPT

## 2024-02-05 PROCEDURE — 70450 CT HEAD/BRAIN W/O DYE: CPT

## 2024-02-05 PROCEDURE — 82077 ASSAY SPEC XCP UR&BREATH IA: CPT

## 2024-02-05 PROCEDURE — 86850 RBC ANTIBODY SCREEN: CPT

## 2024-02-05 PROCEDURE — 700105 HCHG RX REV CODE 258: Performed by: EMERGENCY MEDICINE

## 2024-02-05 PROCEDURE — 70486 CT MAXILLOFACIAL W/O DYE: CPT

## 2024-02-05 PROCEDURE — 73552 X-RAY EXAM OF FEMUR 2/>: CPT | Mod: LT

## 2024-02-05 PROCEDURE — 72125 CT NECK SPINE W/O DYE: CPT

## 2024-02-05 PROCEDURE — 73590 X-RAY EXAM OF LOWER LEG: CPT | Mod: LT

## 2024-02-05 PROCEDURE — 85610 PROTHROMBIN TIME: CPT

## 2024-02-05 PROCEDURE — 36415 COLL VENOUS BLD VENIPUNCTURE: CPT

## 2024-02-05 PROCEDURE — 700117 HCHG RX CONTRAST REV CODE 255: Performed by: EMERGENCY MEDICINE

## 2024-02-05 PROCEDURE — 72131 CT LUMBAR SPINE W/O DYE: CPT

## 2024-02-05 PROCEDURE — 85730 THROMBOPLASTIN TIME PARTIAL: CPT

## 2024-02-05 PROCEDURE — 71260 CT THORAX DX C+: CPT

## 2024-02-05 PROCEDURE — 71045 X-RAY EXAM CHEST 1 VIEW: CPT

## 2024-02-05 PROCEDURE — 80053 COMPREHEN METABOLIC PANEL: CPT

## 2024-02-05 PROCEDURE — 73080 X-RAY EXAM OF ELBOW: CPT | Mod: LT

## 2024-02-05 RX ORDER — SODIUM CHLORIDE, SODIUM LACTATE, POTASSIUM CHLORIDE, AND CALCIUM CHLORIDE .6; .31; .03; .02 G/100ML; G/100ML; G/100ML; G/100ML
INJECTION, SOLUTION INTRAVENOUS
Status: COMPLETED | OUTPATIENT
Start: 2024-02-05 | End: 2024-02-05

## 2024-02-05 RX ORDER — LIDOCAINE HYDROCHLORIDE AND EPINEPHRINE 10; 10 MG/ML; UG/ML
20 INJECTION, SOLUTION INFILTRATION; PERINEURAL ONCE
Status: COMPLETED | OUTPATIENT
Start: 2024-02-05 | End: 2024-02-05

## 2024-02-05 RX ADMIN — SODIUM CHLORIDE, POTASSIUM CHLORIDE, SODIUM LACTATE AND CALCIUM CHLORIDE 1000 ML: 600; 310; 30; 20 INJECTION, SOLUTION INTRAVENOUS at 12:33

## 2024-02-05 RX ADMIN — LIDOCAINE HYDROCHLORIDE AND EPINEPHRINE 20 ML: 10; 10 INJECTION, SOLUTION INFILTRATION; PERINEURAL at 16:00

## 2024-02-05 RX ADMIN — IOHEXOL 100 ML: 350 INJECTION, SOLUTION INTRAVENOUS at 12:44
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
## 2024-02-05 NOTE — ED TRIAGE NOTES
Pt transferred to [place] [facility]. Pt awake A&O x 4. Officer remains at bedside. Begum c.o pain to left side with abrasions.

## 2024-02-06 NOTE — ED NOTES
Discharged to [facility]. Pt robert returned to him calling [facility] for transportation home.

## 2024-02-06 NOTE — DISCHARGE INSTRUCTIONS
Follow-up at the [facility] [place] pulmonary medicine clinic next week for the pulmonary nodule which was incidentally found today on your CT scan.  Please call for appointment.    Follow-up with Dr. Stephens, orthopedic surgeon, for your elbow and leg contusions.  Please call for appointment.    Return to the ER for any headache, dizziness or lightheadedness, nausea, vomiting, feeling of being off balance, lethargy, behavioral changes, abdominal pain, shortness of breath, signs of infection to any areas of your wounds/abrasions, increased elbow pain, numbness or tingling to your arms or legs, or for any concerns.    Keep your wounds clean and dry.  Use antibiotic ointment to help prevent infection.    The wound that was sutured today on your forehead will need sutures removed in 5 days.  You can return to the ER for suture removal or follow-up in urgent care with your primary care doctor.    Stop drinking alcohol!    Use ice to help with the pain.    Wear your sling for comfort until seen by orthopedics.  However, take your arm out of the sling several times a day and move your elbow around.  This will help prevent frozen shoulder and frozen elbow.

## 2024-02-06 NOTE — ED NOTES
Pt ambulating in room found to be at sink urinating. Pt reports having mtm and will take a taxi home. Abx ointment applied to head laceration. Pt reports he plans to shower and clean abrasions once home. Pt understands wound care and has abx ointment at home. Breathalyzer .041

## 2024-02-08 ENCOUNTER — HOSPITAL ENCOUNTER (EMERGENCY)
Facility: MEDICAL CENTER | Age: 69
End: 2024-02-08
Payer: MEDICAID

## 2024-02-08 VITALS
OXYGEN SATURATION: 97 % | TEMPERATURE: 97.7 F | SYSTOLIC BLOOD PRESSURE: 115 MMHG | HEART RATE: 92 BPM | RESPIRATION RATE: 14 BRPM | DIASTOLIC BLOOD PRESSURE: 80 MMHG

## 2024-02-08 PROCEDURE — 302449 STATCHG TRIAGE ONLY (STATISTIC)

## 2024-02-08 NOTE — ED NOTES
Pt ambulated to triage to have sutures removed that he had placed on 2/5. Pt made aware this is too soon to have removed. Pt verbalized understanding and understands he needs to have removed after 5 days. Pt ambulated out of ed without difficulty

## 2024-02-10 ENCOUNTER — HOSPITAL ENCOUNTER (EMERGENCY)
Facility: MEDICAL CENTER | Age: 69
End: 2024-02-10
Attending: EMERGENCY MEDICINE
Payer: MEDICAID

## 2024-02-10 VITALS
OXYGEN SATURATION: 97 % | BODY MASS INDEX: 18.51 KG/M2 | SYSTOLIC BLOOD PRESSURE: 144 MMHG | HEIGHT: 72 IN | DIASTOLIC BLOOD PRESSURE: 90 MMHG | WEIGHT: 136.69 LBS | RESPIRATION RATE: 14 BRPM | TEMPERATURE: 96.5 F | HEART RATE: 77 BPM

## 2024-02-10 DIAGNOSIS — Z48.02 VISIT FOR SUTURE REMOVAL: ICD-10-CM

## 2024-02-10 DIAGNOSIS — B88.8 INFESTATION BY BED BUG: ICD-10-CM

## 2024-02-10 PROCEDURE — 99283 EMERGENCY DEPT VISIT LOW MDM: CPT

## 2024-02-10 ASSESSMENT — FIBROSIS 4 INDEX: FIB4 SCORE: 4.05

## 2024-02-10 NOTE — ED NOTES
Patient given discharge instructions, home care instructions explained, patient verbalized understanding of instructions given/patient understands importance of follow up, patient ambulatory to ER [facility]. Pt given a keara pass.

## 2024-02-10 NOTE — DISCHARGE INSTRUCTIONS
Return to the ER for any concerns about your wound infection    Follow-up with a primary care provider as needed
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
## 2024-02-10 NOTE — ED TRIAGE NOTES
Pt amb to triage.  Chief Complaint   Patient presents with    Suture Removal     Forehead x5     Sutures embedded in scab and difficult to remove in triage.   Room requested.

## 2024-03-01 ENCOUNTER — APPOINTMENT (OUTPATIENT)
Dept: RADIOLOGY | Facility: MEDICAL CENTER | Age: 69
End: 2024-03-01
Attending: EMERGENCY MEDICINE
Payer: MEDICAID

## 2024-03-01 ENCOUNTER — HOSPITAL ENCOUNTER (EMERGENCY)
Facility: MEDICAL CENTER | Age: 69
End: 2024-03-01
Attending: EMERGENCY MEDICINE
Payer: MEDICAID

## 2024-03-01 VITALS
SYSTOLIC BLOOD PRESSURE: 123 MMHG | OXYGEN SATURATION: 93 % | BODY MASS INDEX: 18.51 KG/M2 | HEIGHT: 72 IN | DIASTOLIC BLOOD PRESSURE: 81 MMHG | WEIGHT: 136.69 LBS | HEART RATE: 75 BPM | TEMPERATURE: 97 F | RESPIRATION RATE: 20 BRPM

## 2024-03-01 DIAGNOSIS — F10.921 ALCOHOL INTOXICATION WITH DELIRIUM (HCC): ICD-10-CM

## 2024-03-01 LAB
ABO + RH BLD: NORMAL
ABO GROUP BLD: NORMAL
ALBUMIN SERPL BCP-MCNC: 3.8 G/DL (ref 3.2–4.9)
ALBUMIN/GLOB SERPL: 0.8 G/DL
ALP SERPL-CCNC: 109 U/L (ref 30–99)
ALT SERPL-CCNC: 14 U/L (ref 2–50)
AMMONIA PLAS-SCNC: 18 UMOL/L (ref 11–45)
AMPHET UR QL SCN: NEGATIVE
ANION GAP SERPL CALC-SCNC: 14 MMOL/L (ref 7–16)
APTT PPP: 25 SEC (ref 24.7–36)
AST SERPL-CCNC: 31 U/L (ref 12–45)
BARBITURATES UR QL SCN: NEGATIVE
BASOPHILS # BLD AUTO: 0.6 % (ref 0–1.8)
BASOPHILS # BLD: 0.03 K/UL (ref 0–0.12)
BENZODIAZ UR QL SCN: NEGATIVE
BILIRUB SERPL-MCNC: 0.2 MG/DL (ref 0.1–1.5)
BLD GP AB SCN SERPL QL: NORMAL
BUN SERPL-MCNC: 8 MG/DL (ref 8–22)
BZE UR QL SCN: NEGATIVE
CALCIUM ALBUM COR SERPL-MCNC: 8.8 MG/DL (ref 8.5–10.5)
CALCIUM SERPL-MCNC: 8.6 MG/DL (ref 8.5–10.5)
CANNABINOIDS UR QL SCN: POSITIVE
CHLORIDE SERPL-SCNC: 109 MMOL/L (ref 96–112)
CO2 SERPL-SCNC: 21 MMOL/L (ref 20–33)
CREAT SERPL-MCNC: 0.5 MG/DL (ref 0.5–1.4)
EKG IMPRESSION: NORMAL
EOSINOPHIL # BLD AUTO: 0.09 K/UL (ref 0–0.51)
EOSINOPHIL NFR BLD: 1.7 % (ref 0–6.9)
ERYTHROCYTE [DISTWIDTH] IN BLOOD BY AUTOMATED COUNT: 45.9 FL (ref 35.9–50)
ETHANOL BLD-MCNC: 398.5 MG/DL
FENTANYL UR QL: NEGATIVE
GFR SERPLBLD CREATININE-BSD FMLA CKD-EPI: 110 ML/MIN/1.73 M 2
GLOBULIN SER CALC-MCNC: 4.7 G/DL (ref 1.9–3.5)
GLUCOSE SERPL-MCNC: 96 MG/DL (ref 65–99)
HCT VFR BLD AUTO: 39.3 % (ref 42–52)
HGB BLD-MCNC: 13.2 G/DL (ref 14–18)
IMM GRANULOCYTES # BLD AUTO: 0.06 K/UL (ref 0–0.11)
IMM GRANULOCYTES NFR BLD AUTO: 1.1 % (ref 0–0.9)
INR PPP: 1.07 (ref 0.87–1.13)
LYMPHOCYTES # BLD AUTO: 2.79 K/UL (ref 1–4.8)
LYMPHOCYTES NFR BLD: 53.1 % (ref 22–41)
MCH RBC QN AUTO: 33.7 PG (ref 27–33)
MCHC RBC AUTO-ENTMCNC: 33.6 G/DL (ref 32.3–36.5)
MCV RBC AUTO: 100.3 FL (ref 81.4–97.8)
METHADONE UR QL SCN: NEGATIVE
MONOCYTES # BLD AUTO: 0.4 K/UL (ref 0–0.85)
MONOCYTES NFR BLD AUTO: 7.6 % (ref 0–13.4)
NEUTROPHILS # BLD AUTO: 1.88 K/UL (ref 1.82–7.42)
NEUTROPHILS NFR BLD: 35.9 % (ref 44–72)
NRBC # BLD AUTO: 0 K/UL
NRBC BLD-RTO: 0 /100 WBC (ref 0–0.2)
OPIATES UR QL SCN: NEGATIVE
OXYCODONE UR QL SCN: NEGATIVE
PCP UR QL SCN: NEGATIVE
PLATELET # BLD AUTO: 283 K/UL (ref 164–446)
PMV BLD AUTO: 8.8 FL (ref 9–12.9)
POTASSIUM SERPL-SCNC: 3.9 MMOL/L (ref 3.6–5.5)
PROPOXYPH UR QL SCN: NEGATIVE
PROT SERPL-MCNC: 8.5 G/DL (ref 6–8.2)
PROTHROMBIN TIME: 14 SEC (ref 12–14.6)
RBC # BLD AUTO: 3.92 M/UL (ref 4.7–6.1)
RH BLD: NORMAL
SODIUM SERPL-SCNC: 144 MMOL/L (ref 135–145)
TROPONIN T SERPL-MCNC: 11 NG/L (ref 6–19)
WBC # BLD AUTO: 5.3 K/UL (ref 4.8–10.8)

## 2024-03-01 PROCEDURE — 700117 HCHG RX CONTRAST REV CODE 255: Performed by: EMERGENCY MEDICINE

## 2024-03-01 PROCEDURE — 82140 ASSAY OF AMMONIA: CPT

## 2024-03-01 PROCEDURE — 85025 COMPLETE CBC W/AUTO DIFF WBC: CPT

## 2024-03-01 PROCEDURE — 82077 ASSAY SPEC XCP UR&BREATH IA: CPT

## 2024-03-01 PROCEDURE — 70498 CT ANGIOGRAPHY NECK: CPT

## 2024-03-01 PROCEDURE — 70496 CT ANGIOGRAPHY HEAD: CPT

## 2024-03-01 PROCEDURE — 70450 CT HEAD/BRAIN W/O DYE: CPT

## 2024-03-01 PROCEDURE — 84484 ASSAY OF TROPONIN QUANT: CPT

## 2024-03-01 PROCEDURE — 85610 PROTHROMBIN TIME: CPT

## 2024-03-01 PROCEDURE — 99285 EMERGENCY DEPT VISIT HI MDM: CPT

## 2024-03-01 PROCEDURE — 85730 THROMBOPLASTIN TIME PARTIAL: CPT

## 2024-03-01 PROCEDURE — 80307 DRUG TEST PRSMV CHEM ANLYZR: CPT

## 2024-03-01 PROCEDURE — 80053 COMPREHEN METABOLIC PANEL: CPT

## 2024-03-01 PROCEDURE — 99284 EMERGENCY DEPT VISIT MOD MDM: CPT | Performed by: STUDENT IN AN ORGANIZED HEALTH CARE EDUCATION/TRAINING PROGRAM

## 2024-03-01 PROCEDURE — 0042T CT-CEREBRAL PERFUSION ANALYSIS: CPT

## 2024-03-01 PROCEDURE — 71045 X-RAY EXAM CHEST 1 VIEW: CPT

## 2024-03-01 PROCEDURE — 36415 COLL VENOUS BLD VENIPUNCTURE: CPT

## 2024-03-01 PROCEDURE — 93005 ELECTROCARDIOGRAM TRACING: CPT | Performed by: EMERGENCY MEDICINE

## 2024-03-01 PROCEDURE — 94760 N-INVAS EAR/PLS OXIMETRY 1: CPT

## 2024-03-01 PROCEDURE — 86901 BLOOD TYPING SEROLOGIC RH(D): CPT

## 2024-03-01 PROCEDURE — 86850 RBC ANTIBODY SCREEN: CPT

## 2024-03-01 PROCEDURE — 86900 BLOOD TYPING SEROLOGIC ABO: CPT

## 2024-03-01 RX ADMIN — IOHEXOL 120 ML: 350 INJECTION, SOLUTION INTRAVENOUS at 11:43

## 2024-03-01 NOTE — PROGRESS NOTES
Cancel Stemi per Dr Kohli.  Pt to be worked up for Acute head bleed prior. Pt to go to [facility].  Pt was found unresponsive on bus with stroke like symptoms.

## 2024-03-01 NOTE — ED NOTES
Bedside report to Sally NICHOLAS. Pt on 2L NC, on cardiac monitor, on bed alarm, GCS 11. Bed locked in lowest position side rails up x 2.

## 2024-03-01 NOTE — CONSULTS
"Neurology Initial Consult H&P  Neurohospitalist Service, [facility]    Referring Physician: Constantine Wilcox M.D.    Chief Complaint   Patient presents with    ALOC    Possible Stroke       HPI: Erika Sal is a 89 y.o. male with no known past medical history presented to [facility] after EMS was called when he went on to public transportation bus and slumped over.  EMS found the patient awake with eyes open not following commands, dropping all extremities.  Left pupil observed to be larger than right approximately 5 to 6 mm and unreactive to light.  Patient was brought to [facility] for further evaluation.  Patient NIH of 25 no localizing features aside from abnormal pupil although uncooperative not following commands answering any questions.  EMS did report that at times patient stated \"fuck you\".  But did not follow any commands.  Patient was brought back for emergent head imaging.    Review of systems: In addition to what is detailed in the HPI above, all other systems reviewed and are negative.    Past Medical History:    has no past medical history on file.    FHx:  family history is not on file.    SHx:       Allergies:  No Known Allergies    Medications:  No current facility-administered medications for this encounter.  No current outpatient medications on file.      NEUROLOGICAL EXAM:     MENTAL STATUS: Drowsy, intermittently opens eyes but not to command. Follows no commands  LANG/SPEECH: Does not follow commands, briefly had mumbling speech, EMS reported patient state at one time \"fuck you\"    CRANIAL NERVES:  II: Pupils round, Rt-2-3 mm Lt- 5-6 unreactive to light, no BTT bilaterally  III, IV, VI: Did not follow command to test, no sponatenous nystamgus, eyes conjugate in forward gaze  V: Could not assess  VII: no asymmetry, no nasolabial fold flattening  VIII: Unclear  IX, X: Could not assess  XI: Shoulders equal height  XII: Did not assess    MOTOR: Drops " "bilateral upper extremities to bed but has some slow downward movement and are not flaccidly weak.  Bilateral lower extremities dropped to bed makes no movements to pain or with command.    REFLEXES:  no clonus  SENSORY: Does not respond to pinch in bilateral upper or lower extremities  COORD: Could not assess  GAIT: Deferred      NIHSS: National Institutes of Health Stroke Scale    [1] 1a:Level of Consciousness    0-alert 1-drowsy   2-stupor   3-coma  [2] 1b:LOC Questions                  0-both  1-one      2-neither  [2] 1c:LOC Commands                   0-both  1-one      2-neither  [0] 2: Best Gaze                     0-nl    1-partial  2-forced  [1] 3: Visual Fields                   0-nl    1-partial  2-complete 3-bilat  [0] 4: Facial Paresis                0-nl    1-minor    2-partial  3-full  MOTOR                       0-nl  [2] 5: Right Arm           1-drift  [2] 6: Left Arm             2-some effort vs gravity  [4] 7: Right Leg           3-no effort vs gravity  [4] 8: Left Leg             4-no movement                             x-untestable  [0] 9: Limb Ataxia                    0-abs   1-1_limb   2-2+_limbs       x-untestable  [2] 10:Sensory                        0-nl    1-partial  2-dense  [3] 11:Best Language/Aphasia         0-nl    1-mild/mod 2-severe   3-mute  [2] 12:Dysarthria                     0-nl    1-mild/mod 2-severe       x-untestable  [0] 13:Neglect/Inattention            0-none  1-partial  2-complete    [25] TOTAL (No signs of localizing neurologic injury, strength deficits appear equal bilaterally and unclear if patient is merely unable to follow commands      Objective Data:    Labs:  No results found for: \"PROTHROMBTM\", \"INR\"   No results found for: \"WBC\", \"RBC\", \"HEMOGLOBIN\", \"HEMATOCRIT\", \"MCV\", \"MCH\", \"MCHC\", \"MPV\", \"NEUTSPOLYS\", \"LYMPHOCYTES\", \"MONOCYTES\", \"EOSINOPHILS\", \"BASOPHILS\", \"HYPOCHROMIA\", \"ANISOCYTOSIS\"   No results found for: \"SODIUM\", \"POTASSIUM\", \"CHLORIDE\", \"CO2\", " "\"GLUCOSE\", \"BUN\", \"CREATININE\", \"BUNCREATRAT\", \"GLOMRATE\"   No results found for: \"CHOLSTRLTOT\", \"LDL\", \"HDL\", \"TRIGLYCERIDE\"    No results found for: \"ALKPHOSPHAT\", \"ASTSGOT\", \"ALTSGPT\", \"TBILIRUBIN\"     Imaging/Testing:    I interpreted and/or reviewed the patient's neuroimaging    DX-CHEST-PORTABLE (1 VIEW)    (Results Pending)   CT-HEAD W/O    (Results Pending)   CT-CEREBRAL PERFUSION ANALYSIS    (Results Pending)   CT-CTA HEAD WITH & W/O-POST PROCESS    (Results Pending)   CT-CTA NECK WITH & W/O-POST PROCESSING    (Results Pending)       Assessment and Plan:  Erika Sal is a 89 y.o. male with no known past medical history presented to [facility] after EMS was called when he went on to public transportation bus and slumped over. Patient initially triaged as a stroke alert secondary to profound mental status changes and unequal and unreactive Lt pupil. Subsequent acute neurovascular imaging failed to demonstrate acute pathology. Patient not a candidate for IV-thrombolytic therapy secondary to non-localizing neurologic exam, unclear last known normal and no clear intervenable pathology on imaging. At this time, would recommend emergency room work up for toxic, metabolic and infectious causes of acute altered mental status. Imaging is not consistent for an acute ischemic injury, Lt-pupillary findings may be chronic as patient may have underlying cataracts.     Problem list:  -- Acute altered mental status    Recommendations:  -- Recommend primary team work up toxic, metabolic and infectious causes of encephalopathy  -- No further neurodiagnostic work up recommended at this time; should patient fail to recover neurologic function can consider follow MRI brain w/o contrast to assess for underlying structural neurologic changes  -- Neurology will sign off at this time; please call with questions or concerns    Sy Copeland III, MD  Vascular Neurology, [facility]    "

## 2024-03-01 NOTE — ED PROVIDER NOTES
ED PHYSICIAN NOTE    CHIEF COMPLAINT  Chief Complaint   Patient presents with    ALOC    Possible Stroke       EXTERNAL RECORDS REVIEWED  As noted below    HPI/ROS  LIMITATION TO HISTORY   Select: Altered mental status / Confusion  OUTSIDE HISTORIAN(S):  EMS  called paramedics because the patient was altered.   reported the patient was slumped forward in the chair.  They lowered him onto the ground.  Paramedics found patient minimally responsive flaccid with abnormal pupils.  Accu-Chek was acceptable.  An EKG was performed showing inferior elevation.  Stroke and STEMI protocols were initiated.    Erika Sal is a 89 y.o. adult who presents altered mental status.  History is per paramedics.  There is no known trauma.  The patient does not answer any questions.    Prior records reviewed MRN [id number].  Patient has history of abnormal EKG with ST elevation.    PAST MEDICAL HISTORY  Unknown    SOCIAL HISTORY       CURRENT MEDICATIONS  Home Medications    **Home medications have not yet been reviewed for this encounter**         ALLERGIES  No Known Allergies    PHYSICAL EXAM  VITAL SIGNS: Pulse 72   Temp 36 °C (96.8 °F) (Temporal)   Resp 18   Ht 1.829 m (6')   Wt 70.3 kg (155 lb)   BMI 21.02 kg/m²    Constitutional: Sleeping but arousable.  Opens his eyes to painful stimulus.  Incomprehensible sounds, does not move.  HENT: Normal inspection  Eyes: Left pupil is about 5 mm and reactive.  Right pupil 3 mm reactive.  Neck: Grossly normal range of motion.  Cardiovascular: Normal heart rate, Normal rhythm.  Symmetric peripheral pulses.   Thorax & Lungs: No respiratory distress, No wheezing, No rales, No rhonchi, No chest tenderness.   Abdomen: Bowel sounds normal, soft, non-distended, nontender, no mass  Skin: No obvious rash.  Back: No tenderness, No CVA tenderness.   Extremities: No clubbing, cyanosis, edema, no Homans or cords.        DIAGNOSTIC STUDIES / PROCEDURES  LABS/EKG  Results for  orders placed or performed during the hospital encounter of 03/01/24   CBC WITH DIFFERENTIAL   Result Value Ref Range    WBC 5.3 4.8 - 10.8 K/uL    RBC 3.92 (L) 4.70 - 6.10 M/uL    Hemoglobin 13.2 (L) 14.0 - 18.0 g/dL    Hematocrit 39.3 (L) 42.0 - 52.0 %    .3 (H) 81.4 - 97.8 fL    MCH 33.7 (H) 27.0 - 33.0 pg    MCHC 33.6 32.3 - 36.5 g/dL    RDW 45.9 35.9 - 50.0 fL    Platelet Count 283 164 - 446 K/uL    MPV 8.8 (L) 9.0 - 12.9 fL    Neutrophils-Polys 35.90 (L) 44.00 - 72.00 %    Lymphocytes 53.10 (H) 22.00 - 41.00 %    Monocytes 7.60 0.00 - 13.40 %    Eosinophils 1.70 0.00 - 6.90 %    Basophils 0.60 0.00 - 1.80 %    Immature Granulocytes 1.10 (H) 0.00 - 0.90 %    Nucleated RBC 0.00 0.00 - 0.20 /100 WBC    Neutrophils (Absolute) 1.88 1.82 - 7.42 K/uL    Lymphs (Absolute) 2.79 1.00 - 4.80 K/uL    Monos (Absolute) 0.40 0.00 - 0.85 K/uL    Eos (Absolute) 0.09 0.00 - 0.51 K/uL    Baso (Absolute) 0.03 0.00 - 0.12 K/uL    Immature Granulocytes (abs) 0.06 0.00 - 0.11 K/uL    NRBC (Absolute) 0.00 K/uL   COMP METABOLIC PANEL   Result Value Ref Range    Sodium 144 135 - 145 mmol/L    Potassium 3.9 3.6 - 5.5 mmol/L    Chloride 109 96 - 112 mmol/L    Co2 21 20 - 33 mmol/L    Anion Gap 14.0 7.0 - 16.0    Glucose 96 65 - 99 mg/dL    Bun 8 8 - 22 mg/dL    Creatinine 0.50 0.50 - 1.40 mg/dL    Calcium 8.6 8.5 - 10.5 mg/dL    Correct Calcium 8.8 8.5 - 10.5 mg/dL    AST(SGOT) 31 12 - 45 U/L    ALT(SGPT) 14 2 - 50 U/L    Alkaline Phosphatase 109 (H) 30 - 99 U/L    Total Bilirubin 0.2 0.1 - 1.5 mg/dL    Albumin 3.8 3.2 - 4.9 g/dL    Total Protein 8.5 (H) 6.0 - 8.2 g/dL    Globulin 4.7 (H) 1.9 - 3.5 g/dL    A-G Ratio 0.8 g/dL   PROTHROMBIN TIME   Result Value Ref Range    PT 14.0 12.0 - 14.6 sec    INR 1.07 0.87 - 1.13   APTT   Result Value Ref Range    APTT 25.0 24.7 - 36.0 sec   TROPONIN   Result Value Ref Range    Troponin T 11 6 - 19 ng/L   DIAGNOSTIC ALCOHOL   Result Value Ref Range    Diagnostic Alcohol 398.5 (H) <10.1 mg/dL    ESTIMATED GFR   Result Value Ref Range    GFR (CKD-EPI) 110 >60 mL/min/1.73 m 2   EKG (NOW)   Result Value Ref Range    Report       [facility] Emergency Dept.    Test Date:  2024  Pt Name:    OOLONG NINETY-EIGHT          Department: ER  MRN:        [id number]                      Room:       [facility]  Gender:                                  Technician: [id number]  :        1901                   Requested By:YANET GLORIA  Order #:    [id number]                    Reading MD: YANET GLORIA MD    Measurements  Intervals                                Axis  Rate:       71                           P:          76  OK:         199                          QRS:        83  QRSD:       88                           T:          77  QT:         398  QTc:        433    Interpretive Statements  Sinus rhythm  Inferior infarct, acute (LCx)  ST elevation, consider anterior injury  Lateral leads are also involved    Compared to prior  He has persistent ST persistent concave upward ST elevation  Electronically Signed On 2024 11:42:32 PST by YANET GLORIA MD        I have independently interpreted this EKG as documented above  Rhythm strip interpretation-sinus rhythm    RADIOLOGY  I have independently interpreted the diagnostic imaging associated with this visit and am waiting the final reading from the radiologist.   My preliminary interpretation is as follows: CT head without hemorrhage  Radiologist interpretation:   CT-CEREBRAL PERFUSION ANALYSIS   Final Result      1.  Cerebral blood flow less than 30% likely representing completed infarct = 3 mL. This represents artifact in the vicinity of the internal occipital protuberance/cisterna magna.      2.  T Max more than 6 seconds likely representing combination of completed infarct and ischemia = 3 mL. This represents artifact in the vicinity of the internal occipital protuberance/cisterna magna.      3.  Mismatched volume likely representing  ischemic brain/penumbra = None      4.  Perfusion algorithm finding is artifactual. No cerebral perfusion abnormality is evident.      Please note that the cerebral perfusion was performed on the limited brain tissue around the basal ganglia region. Infarct/ischemia outside the CT perfusion sections can be missed in this study.      DX-CHEST-PORTABLE (1 VIEW)   Final Result      1.  Right mid shaft clavicle fracture.   2.  Otherwise, no acute cardiopulmonary disease.      CT-HEAD W/O    (Results Pending)   CT-CTA HEAD WITH & W/O-POST PROCESS    (Results Pending)   CT-CTA NECK WITH & W/O-POST PROCESSING    (Results Pending)         COURSE & MEDICAL DECISION MAKING    INITIAL ASSESSMENT, COURSE AND PLAN  Care Narrative: Patient presents with altered mental status.  Patient has a nonfocal examination, but does have a dilated right pupil.  He has an abnormal EKG, but this appears to be stable.  His vital signs were normal.  Complete emergent neuro evaluation was undertaken.    CT imaging returned without emergent findings.  I spoke with the neurologist, Dr. Gonzalez who evaluated the patient.  He did not recommend MRI of the brain unless the patient does not improve or another source of encephalopathy is not identified.    Patient has right midshaft clavicle fracture evident on x-ray.  He has no tenderness or swelling.  It is unclear if this is acute.    Patient was reassessed.  He was speaking after CT scan.  Slow deliberate speech most consistent with alcohol use and in fact on review of the chart he drinks a gallon of vodka a day.  Alcohol abuse is suspected.    Laboratory data returned as noted above.  Patient is remarkably intoxicated blood alcohol 398.  No other metabolic derangement.  Not anemic.  No leukocytosis or left shift.    At this point patient needs to metabolize alcohol and be reassessed.  Sure no persistent neurologic symptoms or any medical complaints.  Will need to determine if clavicle fracture  is acute or not.  Admit to ED observation 11:45 AM 3/1/2024.  Care is checked out to Dr. Encinas.        ADDITIONAL PROBLEM LIST  Abnormal EKG-chronic    DISPOSITION AND DISCUSSIONS  I have discussed management of the patient with the following physicians and ANDREEA's: Dr. Gonzalez, neurology; Dr. Kohli, cardiology    IMPRESSION  1.  Encephalopathy, suspected secondary to alcohol intoxication    This dictation was created using voice recognition software. The accuracy of the dictation is limited to the abilities of the software. I expect there may be some errors of grammar and possibly content. The nursing notes were reviewed and certain aspects of this information were incorporated into this note.    Electronically signed by: Constantine Wilcox M.D., 3/1/2024

## 2024-03-01 NOTE — ED TRIAGE NOTES
.  Chief Complaint   Patient presents with    ALOC    Possible Stroke      Pt BIB EMS. Per report Pt got onto bus when he collapsed in the seat. Pt presents altered, non verbal, unable to follow commands, slurred speech. . GCS 12 no visible trauma noted.    Pt called as a STEMI/Stroke.

## 2024-03-01 NOTE — ED NOTES
Unable to address [facility] at this time. Patient is unable to participate in interview (ALOC). Pharmacy on file in patient's other chart is [facility] on [address] ([phone]); [facility] states no active prescriptions on file for patient. Unable to verify whether patient uses any other pharmacy. Phone number listed in patient contacts of patient's other chart for patient's brother Fortino ([phone]) is not valid.

## 2024-03-01 NOTE — PROGRESS NOTES
STEMI alert called.  Not much information know.  Patient was on the bus when  noted he was unresponsive.      ECG reviewed is sinus rhythm, 71 bpm, diffuse ST elevation could be early repolarization.     Cancel STEMI alert.  Continue workup for possible stroke.  It was deemed at this time formal cardiology consult not needed.  Please call cardiology back if situation changes.

## 2024-03-01 NOTE — DISCHARGE SUMMARY
ED Observation Discharge Summary    Patient:Erika N[age]  Patient : 1955  Patient MRN: [id number]  Patient PCP: Pcp Pt States None    Admit Date: 3/1/2024  Discharge Date and Time: 24 3:16 PM  Discharge Diagnosis:   1. Alcohol intoxication with delirium (HCC)            Discharge Attending: Nelson Encinas M.D.  Discharge Service: ED Observation    ED Course  Erika is a 68 y.o. male who was evaluated at [facility] for altered mental status, the patient was initially called a STEMI but also called a stroke due to the patient's altered mental status.  The patient is blood alcohol ended up being elevated.  Cardiology signed off on the STEMI.  Neurology signed off on the stroke.    Patient was monitored here.  The patient is intoxicated, slowly metabolized, the patient was able to ambulate without difficulty, became very aggressive and cursing at me and the staff.  The patient is stable on his feet, will be discharged home, instructed not to drink.    Discharge Exam:  /70   Pulse 71   Temp 36 °C (96.8 °F) (Temporal)   Resp 18   Ht 1.829 m (6')   Wt 62 kg (136 lb 11 oz)   SpO2 96%   BMI 18.54 kg/m² .    Constitutional: Awake and alert. Nontoxic  HENT:  Grossly normal  Eyes: Grossly normal  Neck: Normal range of motion  Cardiovascular: Normal heart rate   Thorax & Lungs: No respiratory distress  Abdomen: Nontender  Skin:  No pathologic rash.   Extremities: Well perfused  Psychiatric: Disheveled slurred speech    Labs  Results for orders placed or performed during the hospital encounter of 24   CBC WITH DIFFERENTIAL   Result Value Ref Range    WBC 5.3 4.8 - 10.8 K/uL    RBC 3.92 (L) 4.70 - 6.10 M/uL    Hemoglobin 13.2 (L) 14.0 - 18.0 g/dL    Hematocrit 39.3 (L) 42.0 - 52.0 %    .3 (H) 81.4 - 97.8 fL    MCH 33.7 (H) 27.0 - 33.0 pg    MCHC 33.6 32.3 - 36.5 g/dL    RDW 45.9 35.9 - 50.0 fL    Platelet Count 283 164 - 446 K/uL    MPV 8.8 (L) 9.0 - 12.9 fL     Neutrophils-Polys 35.90 (L) 44.00 - 72.00 %    Lymphocytes 53.10 (H) 22.00 - 41.00 %    Monocytes 7.60 0.00 - 13.40 %    Eosinophils 1.70 0.00 - 6.90 %    Basophils 0.60 0.00 - 1.80 %    Immature Granulocytes 1.10 (H) 0.00 - 0.90 %    Nucleated RBC 0.00 0.00 - 0.20 /100 WBC    Neutrophils (Absolute) 1.88 1.82 - 7.42 K/uL    Lymphs (Absolute) 2.79 1.00 - 4.80 K/uL    Monos (Absolute) 0.40 0.00 - 0.85 K/uL    Eos (Absolute) 0.09 0.00 - 0.51 K/uL    Baso (Absolute) 0.03 0.00 - 0.12 K/uL    Immature Granulocytes (abs) 0.06 0.00 - 0.11 K/uL    NRBC (Absolute) 0.00 K/uL   COMP METABOLIC PANEL   Result Value Ref Range    Sodium 144 135 - 145 mmol/L    Potassium 3.9 3.6 - 5.5 mmol/L    Chloride 109 96 - 112 mmol/L    Co2 21 20 - 33 mmol/L    Anion Gap 14.0 7.0 - 16.0    Glucose 96 65 - 99 mg/dL    Bun 8 8 - 22 mg/dL    Creatinine 0.50 0.50 - 1.40 mg/dL    Calcium 8.6 8.5 - 10.5 mg/dL    Correct Calcium 8.8 8.5 - 10.5 mg/dL    AST(SGOT) 31 12 - 45 U/L    ALT(SGPT) 14 2 - 50 U/L    Alkaline Phosphatase 109 (H) 30 - 99 U/L    Total Bilirubin 0.2 0.1 - 1.5 mg/dL    Albumin 3.8 3.2 - 4.9 g/dL    Total Protein 8.5 (H) 6.0 - 8.2 g/dL    Globulin 4.7 (H) 1.9 - 3.5 g/dL    A-G Ratio 0.8 g/dL   PROTHROMBIN TIME   Result Value Ref Range    PT 14.0 12.0 - 14.6 sec    INR 1.07 0.87 - 1.13   APTT   Result Value Ref Range    APTT 25.0 24.7 - 36.0 sec   COD (ADULT)   Result Value Ref Range    ABO Grouping Only O     Rh Grouping Only POS     Antibody Screen-Cod NEG    TROPONIN   Result Value Ref Range    Troponin T 11 6 - 19 ng/L   URINE DRUG SCREEN   Result Value Ref Range    Amphetamines Urine Negative Negative    Barbiturates Negative Negative    Benzodiazepines Negative Negative    Cocaine Metabolite Negative Negative    Fentanyl, Urine Negative Negative    Methadone Negative Negative    Opiates Negative Negative    Oxycodone Negative Negative    Phencyclidine -Pcp Negative Negative    Propoxyphene Negative Negative    Cannabinoid Metab  Positive (A) Negative   AMMONIA   Result Value Ref Range    Ammonia 18 11 - 45 umol/L   DIAGNOSTIC ALCOHOL   Result Value Ref Range    Diagnostic Alcohol 398.5 (H) <10.1 mg/dL   ESTIMATED GFR   Result Value Ref Range    GFR (CKD-EPI) 110 >60 mL/min/1.73 m 2   ABO Rh Confirm   Result Value Ref Range    ABO Rh Confirm O POS    EKG (NOW)   Result Value Ref Range    Report       [facility] Emergency Dept.    Test Date:  2024  Pt Name:    SHREYAS HULL-EIGHT          Department: ER  MRN:        [id number]                      Room:       [facility]  Gender:                                  Technician: [id number]  :        1901                   Requested By:YANET GLORIA  Order #:    [id number]                    Reading MD: YANET GLORIA MD    Measurements  Intervals                                Axis  Rate:       71                           P:          76  MS:         199                          QRS:        83  QRSD:       88                           T:          77  QT:         398  QTc:        433    Interpretive Statements  Sinus rhythm  Inferior infarct, acute (LCx)  ST elevation, consider anterior injury  Lateral leads are also involved    Compared to prior  He has persistent ST persistent concave upward ST elevation  Electronically Signed On 2024 11:42:32 PST by YANET GLORIA MD         Radiology  CT-CTA NECK WITH & W/O-POST PROCESSING   Final Result      1.  Some tortuosity in the cervical carotid arteries. No measurable stenosis.   2.  Symmetric diminutive caliber vertebral arteries. Vertebrobasilar confluence is intact.      CT-CTA HEAD WITH & W/O-POST PROCESS   Final Result      CT angiogram of the [place] within normal limits.      CT-CEREBRAL PERFUSION ANALYSIS   Final Result      1.  Cerebral blood flow less than 30% likely representing completed infarct = 3 mL. This represents artifact in the vicinity of the internal occipital protuberance/cisterna magna.       2.  T Max more than 6 seconds likely representing combination of completed infarct and ischemia = 3 mL. This represents artifact in the vicinity of the internal occipital protuberance/cisterna magna.      3.  Mismatched volume likely representing ischemic brain/penumbra = None      4.  Perfusion algorithm finding is artifactual. No cerebral perfusion abnormality is evident.      Please note that the cerebral perfusion was performed on the limited brain tissue around the basal ganglia region. Infarct/ischemia outside the CT perfusion sections can be missed in this study.      CT-HEAD W/O   Final Result      1.  Cerebral atrophy.      2.  White matter lucencies most consistent with small vessel ischemic change and/or old deep or subcortical white matter infarcts.      3.  Otherwise, Head CT without contrast with no acute findings. No evidence of acute cerebral infarction, hemorrhage or mass lesion.      4. Consider MRI for further evaluation.      DX-CHEST-PORTABLE (1 VIEW)   Final Result      1.  Right mid shaft clavicle fracture.   2.  Otherwise, no acute cardiopulmonary disease.          Medications:   New Prescriptions    No medications on file       My final assessment includes   1. Alcohol intoxication with delirium (HCC)            Upon Reevaluation, the patient's condition has: Improved; and will be discharged.    Patient discharged from ED Observation status at 315 (Time) 3/1/2024  (Date).     Total time spent on this ED Observation discharge encounter is < 30 Minutes    Electronically signed by: Nelson Encinas M.D., 3/1/2024 3:16 PM

## 2024-03-01 NOTE — ED NOTES
PIV x 2 removed. Pt refusing all discharge paperwork and instructions, pt refusing to sign discharge form. Pt ambulatory from ED with steady gait carrying all belongings.

## 2024-03-01 NOTE — ED NOTES
Bedside report from Bonita NICHOLAS. Pt connected to BP, cardiac and pulse ox. Pt currently on 2L NC connected to wall oxygen. Pt high fall risk, bed alarm in use.

## 2024-03-18 ENCOUNTER — OFFICE VISIT (OUTPATIENT)
Dept: SLEEP MEDICINE | Facility: MEDICAL CENTER | Age: 69
End: 2024-03-18
Attending: EMERGENCY MEDICINE
Payer: MEDICAID

## 2024-03-18 VITALS
OXYGEN SATURATION: 97 % | WEIGHT: 134 LBS | BODY MASS INDEX: 18.15 KG/M2 | HEART RATE: 67 BPM | HEIGHT: 72 IN | SYSTOLIC BLOOD PRESSURE: 110 MMHG | DIASTOLIC BLOOD PRESSURE: 68 MMHG | RESPIRATION RATE: 15 BRPM

## 2024-03-18 DIAGNOSIS — Z23 NEED FOR VACCINATION: ICD-10-CM

## 2024-03-18 DIAGNOSIS — R91.1 LUNG NODULE: ICD-10-CM

## 2024-03-18 DIAGNOSIS — R93.89 ABNORMAL CT OF THE CHEST: ICD-10-CM

## 2024-03-18 PROCEDURE — 3078F DIAST BP <80 MM HG: CPT | Performed by: FAMILY MEDICINE

## 2024-03-18 PROCEDURE — 3074F SYST BP LT 130 MM HG: CPT | Performed by: FAMILY MEDICINE

## 2024-03-18 PROCEDURE — 99212 OFFICE O/P EST SF 10 MIN: CPT | Performed by: FAMILY MEDICINE

## 2024-03-18 PROCEDURE — 99204 OFFICE O/P NEW MOD 45 MIN: CPT | Performed by: FAMILY MEDICINE

## 2024-03-18 ASSESSMENT — FIBROSIS 4 INDEX: FIB4 SCORE: 2.02
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
## 2024-04-15 ENCOUNTER — TELEPHONE (OUTPATIENT)
Dept: SLEEP MEDICINE | Facility: MEDICAL CENTER | Age: 69
End: 2024-04-15
Payer: MEDICAID
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
## 2024-04-15 NOTE — TELEPHONE ENCOUNTER
Phone Number Called: [phone] (home)     Call outcome: Spoke to patient regarding message below.    Message: Patient scheduled an appointment to see  on 4/16, but patient was not supposed to be seen until 3/2025. Called patient to verify the reason for appointment and he stated his lung nodule was still there. Let patient know that he was early for his follow up and that he wasn't expected to be seen until next year. Patient stated he was having shortness of breath and that's why he was coming in. I informed the patient that Dr. Gonzalez does not treat shortness of breath or other issues, she simply does the Lung Cancer Screenings and Lung Nodule Clinic. Advised patient that if he was having shortness of breath, to go to Urgent Care and or his PCP (Which he mentioned was on [facility]). Let patient know that he would have to go to either of those places if he wanted to be treated. Patient Understood. I asked he patient if he wanted me to cancel his appointment with Dr. Gonzalez and patient said yes.

## 2024-04-16 ENCOUNTER — APPOINTMENT (OUTPATIENT)
Dept: SLEEP MEDICINE | Facility: MEDICAL CENTER | Age: 69
End: 2024-04-16
Attending: FAMILY MEDICINE
Payer: MEDICAID

## 2024-07-22 ENCOUNTER — TELEPHONE (OUTPATIENT)
Dept: HEALTH INFORMATION MANAGEMENT | Facility: OTHER | Age: 69
End: 2024-07-22
Payer: MEDICAID
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
## 2025-02-21 ENCOUNTER — OFFICE VISIT (OUTPATIENT)
Dept: SLEEP MEDICINE | Facility: MEDICAL CENTER | Age: 70
End: 2025-02-21
Attending: FAMILY MEDICINE
Payer: MEDICAID

## 2025-02-21 ENCOUNTER — HOSPITAL ENCOUNTER (OUTPATIENT)
Dept: RADIOLOGY | Facility: MEDICAL CENTER | Age: 70
End: 2025-02-21
Attending: PHYSICIAN ASSISTANT

## 2025-02-21 VITALS
WEIGHT: 139 LBS | BODY MASS INDEX: 18.42 KG/M2 | HEART RATE: 70 BPM | DIASTOLIC BLOOD PRESSURE: 70 MMHG | SYSTOLIC BLOOD PRESSURE: 120 MMHG | OXYGEN SATURATION: 97 % | HEIGHT: 73 IN

## 2025-02-21 DIAGNOSIS — F17.210 CIGARETTE SMOKER: ICD-10-CM

## 2025-02-21 DIAGNOSIS — R91.1 LUNG NODULE: ICD-10-CM

## 2025-02-21 PROCEDURE — 99213 OFFICE O/P EST LOW 20 MIN: CPT | Performed by: FAMILY MEDICINE

## 2025-02-21 PROCEDURE — 3074F SYST BP LT 130 MM HG: CPT | Performed by: FAMILY MEDICINE

## 2025-02-21 PROCEDURE — G0296 VISIT TO DETERM LDCT ELIG: HCPCS | Performed by: FAMILY MEDICINE

## 2025-02-21 PROCEDURE — 3078F DIAST BP <80 MM HG: CPT | Performed by: FAMILY MEDICINE

## 2025-02-21 ASSESSMENT — FIBROSIS 4 INDEX: FIB4 SCORE: 2.05

## 2025-02-24 ENCOUNTER — TELEPHONE (OUTPATIENT)
Dept: VASCULAR LAB | Facility: MEDICAL CENTER | Age: 70
End: 2025-02-24
Payer: MEDICAID

## 2025-02-24 NOTE — TELEPHONE ENCOUNTER
[facility]     Received smoking cessation referral from Court Gonzalez DO on 2/21/25.     Called patient to schedule an appt to establish care. Listed phone number not active, unable to LVM at this time. MyChart not active.    1st attempt     Insurance: [place] Medicaid  PCP: None  Locations to be seen: [facility]    If no response by 3/23/25 OR 2 no shows/cancellations, will remove from referral list    Eleazar Gruber, PharmD  [facility]  Phone [phone]

## 2025-02-25 ENCOUNTER — TELEPHONE (OUTPATIENT)
Dept: HEALTH INFORMATION MANAGEMENT | Facility: OTHER | Age: 70
End: 2025-02-25
Payer: MEDICAID

## 2025-03-04 ENCOUNTER — TELEPHONE (OUTPATIENT)
Dept: VASCULAR LAB | Facility: MEDICAL CENTER | Age: 70
End: 2025-03-04
Payer: MEDICAID

## 2025-03-04 NOTE — TELEPHONE ENCOUNTER
[facility]     Received smoking cessation referral from Court Gonzalez DO on 2/21/25.     Called patient to schedule an appt to establish care. Listed phone number not active, unable to LVM at this time. MyChart not active.     2nd attempt     Sent letter.    Insurance: [place] Medicaid  PCP: None  Locations to be seen: [facility]     If no response by 3/23/25 OR 2 no shows/cancellations, will remove from referral list    Gamaliel Marte, PharmD, BCACP   [facility]  Phone [phone]

## 2025-03-04 NOTE — LETTER
03/04/25        Vitor Cisneros  [address]  [address]  [place] [address]         Dear Vitor,     Your doctor has referred you to our Tobacco Cessation Clinic. This clinic, led by clinical pharmacists, helps people quit smoking.    During this visit, the pharmacist will create a personalized plan for you, which may include medication and counseling.    Quitting smoking is important for your health. We are here to help you every step of the way.    To schedule an appointment at the clinic, please call [phone] or use the link in this message.     Thank you!     Gamaliel Marte, PharmD, BCACP   [facility]   Phone: [phone]